# Patient Record
Sex: MALE | Race: WHITE | Employment: UNEMPLOYED | URBAN - NONMETROPOLITAN AREA
[De-identification: names, ages, dates, MRNs, and addresses within clinical notes are randomized per-mention and may not be internally consistent; named-entity substitution may affect disease eponyms.]

---

## 2018-08-04 ENCOUNTER — APPOINTMENT (OUTPATIENT)
Dept: CT IMAGING | Age: 48
DRG: 897 | End: 2018-08-04
Payer: COMMERCIAL

## 2018-08-04 ENCOUNTER — APPOINTMENT (OUTPATIENT)
Dept: GENERAL RADIOLOGY | Age: 48
DRG: 897 | End: 2018-08-04
Payer: COMMERCIAL

## 2018-08-04 ENCOUNTER — HOSPITAL ENCOUNTER (INPATIENT)
Age: 48
LOS: 4 days | Discharge: NURSING, REHAB OR LONG-TERM CARE | DRG: 897 | End: 2018-08-08
Attending: EMERGENCY MEDICINE | Admitting: INTERNAL MEDICINE
Payer: COMMERCIAL

## 2018-08-04 DIAGNOSIS — R09.02 HYPOXIA: ICD-10-CM

## 2018-08-04 DIAGNOSIS — B20 HUMAN IMMUNODEFICIENCY VIRUS (HIV) DISEASE (HCC): ICD-10-CM

## 2018-08-04 DIAGNOSIS — Z72.0 TOBACCO ABUSE: ICD-10-CM

## 2018-08-04 DIAGNOSIS — F10.931 ALCOHOL WITHDRAWAL SYNDROME, WITH DELIRIUM (HCC): Primary | ICD-10-CM

## 2018-08-04 LAB
ALBUMIN SERPL-MCNC: 3.7 G/DL (ref 3.5–5.2)
ALP BLD-CCNC: 117 U/L (ref 40–130)
ALT SERPL-CCNC: 32 U/L (ref 5–41)
AMMONIA: 55 UMOL/L (ref 16–60)
AMPHETAMINE SCREEN, URINE: NEGATIVE
ANION GAP SERPL CALCULATED.3IONS-SCNC: 11 MMOL/L (ref 7–19)
AST SERPL-CCNC: 57 U/L (ref 5–40)
BARBITURATE SCREEN URINE: NEGATIVE
BASE EXCESS ARTERIAL: 0.2 MMOL/L (ref -2–2)
BASOPHILS ABSOLUTE: 0.2 K/UL (ref 0–0.2)
BASOPHILS RELATIVE PERCENT: 2 % (ref 0–1)
BENZODIAZEPINE SCREEN, URINE: NEGATIVE
BILIRUB SERPL-MCNC: 0.7 MG/DL (ref 0.2–1.2)
BUN BLDV-MCNC: 8 MG/DL (ref 6–20)
CALCIUM SERPL-MCNC: 8.9 MG/DL (ref 8.6–10)
CANNABINOID SCREEN URINE: NEGATIVE
CARBOXYHEMOGLOBIN ARTERIAL: 3.1 % (ref 0–5)
CHLORIDE BLD-SCNC: 99 MMOL/L (ref 98–111)
CO2: 24 MMOL/L (ref 22–29)
COCAINE METABOLITE SCREEN URINE: NEGATIVE
CREAT SERPL-MCNC: 0.5 MG/DL (ref 0.5–1.2)
D DIMER: 0.72 UG/ML FEU (ref 0–0.48)
EOSINOPHILS ABSOLUTE: 0.8 K/UL (ref 0–0.6)
EOSINOPHILS RELATIVE PERCENT: 8.6 % (ref 0–5)
ETHANOL: <10 MG/DL (ref 0–0.08)
GFR NON-AFRICAN AMERICAN: >60
GLUCOSE BLD-MCNC: 119 MG/DL (ref 74–109)
HCO3 ARTERIAL: 24.6 MMOL/L (ref 22–26)
HCT VFR BLD CALC: 33.3 % (ref 42–52)
HEMOGLOBIN, ART, EXTENDED: 10.5 G/DL (ref 14–18)
HEMOGLOBIN: 10.6 G/DL (ref 14–18)
LYMPHOCYTES ABSOLUTE: 1.9 K/UL (ref 1.1–4.5)
LYMPHOCYTES RELATIVE PERCENT: 20.6 % (ref 20–40)
Lab: NORMAL
MAGNESIUM: 2 MG/DL (ref 1.6–2.6)
MCH RBC QN AUTO: 36.9 PG (ref 27–31)
MCHC RBC AUTO-ENTMCNC: 31.8 G/DL (ref 33–37)
MCV RBC AUTO: 116 FL (ref 80–94)
METHEMOGLOBIN ARTERIAL: 1.3 %
MONOCYTES ABSOLUTE: 0.8 K/UL (ref 0–0.9)
MONOCYTES RELATIVE PERCENT: 8.3 % (ref 0–10)
NEUTROPHILS ABSOLUTE: 5.4 K/UL (ref 1.5–7.5)
NEUTROPHILS RELATIVE PERCENT: 58.5 % (ref 50–65)
O2 CONTENT ARTERIAL: 13.3 ML/DL
O2 SAT, ARTERIAL: 89.2 %
O2 THERAPY: ABNORMAL
OPIATE SCREEN URINE: NEGATIVE
PCO2 ARTERIAL: 38 MMHG (ref 35–45)
PDW BLD-RTO: 13.4 % (ref 11.5–14.5)
PH ARTERIAL: 7.42 (ref 7.35–7.45)
PHOSPHORUS: 4.2 MG/DL (ref 2.5–4.5)
PLATELET # BLD: 285 K/UL (ref 130–400)
PMV BLD AUTO: 9.4 FL (ref 9.4–12.4)
PO2 ARTERIAL: 84 MMHG (ref 80–100)
POTASSIUM SERPL-SCNC: 3.6 MMOL/L (ref 3.5–5)
POTASSIUM, WHOLE BLOOD: 3.7
RBC # BLD: 2.87 M/UL (ref 4.7–6.1)
SODIUM BLD-SCNC: 134 MMOL/L (ref 136–145)
TOTAL PROTEIN: 7.2 G/DL (ref 6.6–8.7)
WBC # BLD: 9.2 K/UL (ref 4.8–10.8)

## 2018-08-04 PROCEDURE — 2500000003 HC RX 250 WO HCPCS: Performed by: HOSPITALIST

## 2018-08-04 PROCEDURE — 6360000004 HC RX CONTRAST MEDICATION: Performed by: EMERGENCY MEDICINE

## 2018-08-04 PROCEDURE — 2580000003 HC RX 258: Performed by: HOSPITALIST

## 2018-08-04 PROCEDURE — G0480 DRUG TEST DEF 1-7 CLASSES: HCPCS

## 2018-08-04 PROCEDURE — 6370000000 HC RX 637 (ALT 250 FOR IP): Performed by: EMERGENCY MEDICINE

## 2018-08-04 PROCEDURE — 84100 ASSAY OF PHOSPHORUS: CPT

## 2018-08-04 PROCEDURE — 80307 DRUG TEST PRSMV CHEM ANLYZR: CPT

## 2018-08-04 PROCEDURE — 80053 COMPREHEN METABOLIC PANEL: CPT

## 2018-08-04 PROCEDURE — 82803 BLOOD GASES ANY COMBINATION: CPT

## 2018-08-04 PROCEDURE — 70450 CT HEAD/BRAIN W/O DYE: CPT

## 2018-08-04 PROCEDURE — 99285 EMERGENCY DEPT VISIT HI MDM: CPT | Performed by: EMERGENCY MEDICINE

## 2018-08-04 PROCEDURE — 85379 FIBRIN DEGRADATION QUANT: CPT

## 2018-08-04 PROCEDURE — 96374 THER/PROPH/DIAG INJ IV PUSH: CPT

## 2018-08-04 PROCEDURE — 6360000002 HC RX W HCPCS: Performed by: EMERGENCY MEDICINE

## 2018-08-04 PROCEDURE — 82140 ASSAY OF AMMONIA: CPT

## 2018-08-04 PROCEDURE — 71045 X-RAY EXAM CHEST 1 VIEW: CPT

## 2018-08-04 PROCEDURE — 99291 CRITICAL CARE FIRST HOUR: CPT | Performed by: HOSPITALIST

## 2018-08-04 PROCEDURE — 6370000000 HC RX 637 (ALT 250 FOR IP): Performed by: HOSPITALIST

## 2018-08-04 PROCEDURE — 2580000003 HC RX 258: Performed by: NURSE PRACTITIONER

## 2018-08-04 PROCEDURE — 84630 ASSAY OF ZINC: CPT

## 2018-08-04 PROCEDURE — 6360000002 HC RX W HCPCS: Performed by: HOSPITALIST

## 2018-08-04 PROCEDURE — 96376 TX/PRO/DX INJ SAME DRUG ADON: CPT

## 2018-08-04 PROCEDURE — 6360000002 HC RX W HCPCS: Performed by: NURSE PRACTITIONER

## 2018-08-04 PROCEDURE — 83735 ASSAY OF MAGNESIUM: CPT

## 2018-08-04 PROCEDURE — 85025 COMPLETE CBC W/AUTO DIFF WBC: CPT

## 2018-08-04 PROCEDURE — 2500000003 HC RX 250 WO HCPCS: Performed by: NURSE PRACTITIONER

## 2018-08-04 PROCEDURE — 94640 AIRWAY INHALATION TREATMENT: CPT

## 2018-08-04 PROCEDURE — 36415 COLL VENOUS BLD VENIPUNCTURE: CPT

## 2018-08-04 PROCEDURE — 36600 WITHDRAWAL OF ARTERIAL BLOOD: CPT

## 2018-08-04 PROCEDURE — 84132 ASSAY OF SERUM POTASSIUM: CPT

## 2018-08-04 PROCEDURE — 99223 1ST HOSP IP/OBS HIGH 75: CPT | Performed by: HOSPITALIST

## 2018-08-04 PROCEDURE — 2000000000 HC ICU R&B

## 2018-08-04 PROCEDURE — 71275 CT ANGIOGRAPHY CHEST: CPT

## 2018-08-04 PROCEDURE — 99406 BEHAV CHNG SMOKING 3-10 MIN: CPT | Performed by: HOSPITALIST

## 2018-08-04 PROCEDURE — 99285 EMERGENCY DEPT VISIT HI MDM: CPT

## 2018-08-04 RX ORDER — CYCLOBENZAPRINE HCL 10 MG
10 TABLET ORAL 3 TIMES DAILY PRN
Status: DISCONTINUED | OUTPATIENT
Start: 2018-08-04 | End: 2018-08-08 | Stop reason: HOSPADM

## 2018-08-04 RX ORDER — POTASSIUM CHLORIDE 7.45 MG/ML
10 INJECTION INTRAVENOUS PRN
Status: DISCONTINUED | OUTPATIENT
Start: 2018-08-04 | End: 2018-08-08 | Stop reason: HOSPADM

## 2018-08-04 RX ORDER — ESCITALOPRAM OXALATE 10 MG/1
20 TABLET ORAL DAILY
Status: DISCONTINUED | OUTPATIENT
Start: 2018-08-04 | End: 2018-08-08 | Stop reason: HOSPADM

## 2018-08-04 RX ORDER — HYDROXYZINE PAMOATE 25 MG/1
25 CAPSULE ORAL 3 TIMES DAILY PRN
COMMUNITY

## 2018-08-04 RX ORDER — LORAZEPAM 2 MG/ML
4 INJECTION INTRAMUSCULAR
Status: DISCONTINUED | OUTPATIENT
Start: 2018-08-04 | End: 2018-08-08 | Stop reason: HOSPADM

## 2018-08-04 RX ORDER — KETAMINE HYDROCHLORIDE 10 MG/ML
30 INJECTION, SOLUTION INTRAMUSCULAR; INTRAVENOUS ONCE
Status: COMPLETED | OUTPATIENT
Start: 2018-08-04 | End: 2018-08-04

## 2018-08-04 RX ORDER — IPRATROPIUM BROMIDE AND ALBUTEROL SULFATE 2.5; .5 MG/3ML; MG/3ML
1 SOLUTION RESPIRATORY (INHALATION) ONCE
Status: COMPLETED | OUTPATIENT
Start: 2018-08-04 | End: 2018-08-04

## 2018-08-04 RX ORDER — FOLIC ACID 1 MG/1
1 TABLET ORAL DAILY
Status: DISCONTINUED | OUTPATIENT
Start: 2018-08-08 | End: 2018-08-08 | Stop reason: HOSPADM

## 2018-08-04 RX ORDER — NICOTINE 21 MG/24HR
1 PATCH, TRANSDERMAL 24 HOURS TRANSDERMAL DAILY
Status: DISCONTINUED | OUTPATIENT
Start: 2018-08-04 | End: 2018-08-08 | Stop reason: HOSPADM

## 2018-08-04 RX ORDER — HYDROXYZINE PAMOATE 25 MG/1
25 CAPSULE ORAL 3 TIMES DAILY PRN
Status: DISCONTINUED | OUTPATIENT
Start: 2018-08-04 | End: 2018-08-08 | Stop reason: HOSPADM

## 2018-08-04 RX ORDER — CLONIDINE HYDROCHLORIDE 0.1 MG/1
0.1 TABLET ORAL 2 TIMES DAILY
COMMUNITY

## 2018-08-04 RX ORDER — LORAZEPAM 2 MG/ML
3 INJECTION INTRAMUSCULAR
Status: DISCONTINUED | OUTPATIENT
Start: 2018-08-04 | End: 2018-08-08 | Stop reason: HOSPADM

## 2018-08-04 RX ORDER — TRAZODONE HYDROCHLORIDE 50 MG/1
50 TABLET ORAL NIGHTLY
Status: DISCONTINUED | OUTPATIENT
Start: 2018-08-04 | End: 2018-08-08 | Stop reason: HOSPADM

## 2018-08-04 RX ORDER — THIAMINE HYDROCHLORIDE 100 MG/ML
100 INJECTION, SOLUTION INTRAMUSCULAR; INTRAVENOUS DAILY
Status: DISCONTINUED | OUTPATIENT
Start: 2018-08-08 | End: 2018-08-08 | Stop reason: HOSPADM

## 2018-08-04 RX ORDER — ONDANSETRON 2 MG/ML
4 INJECTION INTRAMUSCULAR; INTRAVENOUS EVERY 6 HOURS PRN
Status: DISCONTINUED | OUTPATIENT
Start: 2018-08-04 | End: 2018-08-08 | Stop reason: HOSPADM

## 2018-08-04 RX ORDER — LORAZEPAM 1 MG/1
3 TABLET ORAL
Status: DISCONTINUED | OUTPATIENT
Start: 2018-08-04 | End: 2018-08-08 | Stop reason: HOSPADM

## 2018-08-04 RX ORDER — SODIUM CHLORIDE 0.9 % (FLUSH) 0.9 %
10 SYRINGE (ML) INJECTION PRN
Status: DISCONTINUED | OUTPATIENT
Start: 2018-08-04 | End: 2018-08-08 | Stop reason: HOSPADM

## 2018-08-04 RX ORDER — ONDANSETRON 2 MG/ML
4 INJECTION INTRAMUSCULAR; INTRAVENOUS EVERY 6 HOURS PRN
Status: DISCONTINUED | OUTPATIENT
Start: 2018-08-04 | End: 2018-08-04

## 2018-08-04 RX ORDER — TRAZODONE HYDROCHLORIDE 50 MG/1
50 TABLET ORAL NIGHTLY
COMMUNITY

## 2018-08-04 RX ORDER — SODIUM CHLORIDE 0.9 % (FLUSH) 0.9 %
10 SYRINGE (ML) INJECTION EVERY 12 HOURS SCHEDULED
Status: DISCONTINUED | OUTPATIENT
Start: 2018-08-04 | End: 2018-08-08 | Stop reason: HOSPADM

## 2018-08-04 RX ORDER — LORAZEPAM 2 MG/1
2 TABLET ORAL EVERY 6 HOURS PRN
COMMUNITY

## 2018-08-04 RX ORDER — HYDROCODONE BITARTRATE AND ACETAMINOPHEN 7.5; 325 MG/1; MG/1
1 TABLET ORAL ONCE
Status: DISCONTINUED | OUTPATIENT
Start: 2018-08-04 | End: 2018-08-04

## 2018-08-04 RX ORDER — LORAZEPAM 2 MG/ML
2 INJECTION INTRAMUSCULAR 2 TIMES DAILY
Status: COMPLETED | OUTPATIENT
Start: 2018-08-06 | End: 2018-08-06

## 2018-08-04 RX ORDER — LORAZEPAM 2 MG/ML
2 INJECTION INTRAMUSCULAR EVERY 8 HOURS SCHEDULED
Status: DISPENSED | OUTPATIENT
Start: 2018-08-05 | End: 2018-08-06

## 2018-08-04 RX ORDER — LORAZEPAM 2 MG/ML
2 INJECTION INTRAMUSCULAR ONCE
Status: COMPLETED | OUTPATIENT
Start: 2018-08-04 | End: 2018-08-04

## 2018-08-04 RX ORDER — LORAZEPAM 1 MG/1
1 TABLET ORAL
Status: DISCONTINUED | OUTPATIENT
Start: 2018-08-04 | End: 2018-08-08 | Stop reason: HOSPADM

## 2018-08-04 RX ORDER — LORAZEPAM 2 MG/ML
2 INJECTION INTRAMUSCULAR ONCE
Status: COMPLETED | OUTPATIENT
Start: 2018-08-07 | End: 2018-08-06

## 2018-08-04 RX ORDER — MULTIVITAMIN WITH FOLIC ACID 400 MCG
1 TABLET ORAL DAILY
Status: DISCONTINUED | OUTPATIENT
Start: 2018-08-08 | End: 2018-08-08 | Stop reason: HOSPADM

## 2018-08-04 RX ORDER — LORAZEPAM 2 MG/ML
1 INJECTION INTRAMUSCULAR ONCE
Status: COMPLETED | OUTPATIENT
Start: 2018-08-04 | End: 2018-08-04

## 2018-08-04 RX ORDER — DICYCLOMINE HYDROCHLORIDE 10 MG/1
10 CAPSULE ORAL
Status: DISCONTINUED | OUTPATIENT
Start: 2018-08-04 | End: 2018-08-08 | Stop reason: HOSPADM

## 2018-08-04 RX ORDER — DICYCLOMINE HYDROCHLORIDE 10 MG/1
10 CAPSULE ORAL
COMMUNITY

## 2018-08-04 RX ORDER — MAGNESIUM SULFATE 1 G/100ML
1 INJECTION INTRAVENOUS PRN
Status: DISCONTINUED | OUTPATIENT
Start: 2018-08-04 | End: 2018-08-08 | Stop reason: HOSPADM

## 2018-08-04 RX ORDER — POLYETHYLENE GLYCOL 3350 17 G/17G
17 POWDER, FOR SOLUTION ORAL DAILY PRN
Status: DISCONTINUED | OUTPATIENT
Start: 2018-08-04 | End: 2018-08-08 | Stop reason: HOSPADM

## 2018-08-04 RX ORDER — ACETAMINOPHEN 325 MG/1
650 TABLET ORAL EVERY 6 HOURS PRN
COMMUNITY

## 2018-08-04 RX ORDER — CYCLOBENZAPRINE HCL 10 MG
10 TABLET ORAL 3 TIMES DAILY PRN
COMMUNITY

## 2018-08-04 RX ORDER — IBUPROFEN 200 MG
200 TABLET ORAL EVERY 6 HOURS PRN
COMMUNITY
End: 2018-08-04

## 2018-08-04 RX ORDER — LORAZEPAM 1 MG/1
4 TABLET ORAL
Status: DISCONTINUED | OUTPATIENT
Start: 2018-08-04 | End: 2018-08-08 | Stop reason: HOSPADM

## 2018-08-04 RX ORDER — LORAZEPAM 2 MG/ML
1 INJECTION INTRAMUSCULAR
Status: DISCONTINUED | OUTPATIENT
Start: 2018-08-04 | End: 2018-08-08 | Stop reason: HOSPADM

## 2018-08-04 RX ORDER — ESCITALOPRAM OXALATE 20 MG/1
20 TABLET ORAL DAILY
COMMUNITY

## 2018-08-04 RX ORDER — CLONIDINE HYDROCHLORIDE 0.1 MG/1
0.1 TABLET ORAL 2 TIMES DAILY
Status: DISCONTINUED | OUTPATIENT
Start: 2018-08-04 | End: 2018-08-08 | Stop reason: HOSPADM

## 2018-08-04 RX ORDER — LORAZEPAM 2 MG/ML
4 INJECTION INTRAMUSCULAR ONCE
Status: COMPLETED | OUTPATIENT
Start: 2018-08-04 | End: 2018-08-04

## 2018-08-04 RX ORDER — LORAZEPAM 2 MG/ML
2 INJECTION INTRAMUSCULAR
Status: DISCONTINUED | OUTPATIENT
Start: 2018-08-04 | End: 2018-08-08 | Stop reason: HOSPADM

## 2018-08-04 RX ORDER — THIAMINE MONONITRATE (VIT B1) 100 MG
100 TABLET ORAL DAILY
Status: DISCONTINUED | OUTPATIENT
Start: 2018-08-08 | End: 2018-08-08 | Stop reason: HOSPADM

## 2018-08-04 RX ORDER — LORAZEPAM 1 MG/1
2 TABLET ORAL
Status: DISCONTINUED | OUTPATIENT
Start: 2018-08-04 | End: 2018-08-08 | Stop reason: HOSPADM

## 2018-08-04 RX ADMIN — LORAZEPAM 2 MG: 2 INJECTION INTRAMUSCULAR; INTRAVENOUS at 19:42

## 2018-08-04 RX ADMIN — IPRATROPIUM BROMIDE AND ALBUTEROL SULFATE 1 AMPULE: .5; 3 SOLUTION RESPIRATORY (INHALATION) at 18:30

## 2018-08-04 RX ADMIN — Medication 30 MG: at 22:10

## 2018-08-04 RX ADMIN — LORAZEPAM 2 MG: 2 INJECTION INTRAMUSCULAR; INTRAVENOUS at 19:35

## 2018-08-04 RX ADMIN — Medication 10 ML: at 22:10

## 2018-08-04 RX ADMIN — LORAZEPAM 4 MG: 2 INJECTION INTRAMUSCULAR; INTRAVENOUS at 20:45

## 2018-08-04 RX ADMIN — ENOXAPARIN SODIUM 40 MG: 40 INJECTION SUBCUTANEOUS at 22:14

## 2018-08-04 RX ADMIN — DEXMEDETOMIDINE HYDROCHLORIDE 0.2 MCG/KG/HR: 100 INJECTION, SOLUTION INTRAVENOUS at 19:46

## 2018-08-04 RX ADMIN — LORAZEPAM 4 MG: 2 INJECTION INTRAMUSCULAR; INTRAVENOUS at 20:52

## 2018-08-04 RX ADMIN — LORAZEPAM 2 MG: 2 INJECTION INTRAMUSCULAR; INTRAVENOUS at 17:52

## 2018-08-04 RX ADMIN — LORAZEPAM 2 MG: 2 INJECTION INTRAMUSCULAR; INTRAVENOUS at 18:46

## 2018-08-04 RX ADMIN — FOLIC ACID: 5 INJECTION, SOLUTION INTRAMUSCULAR; INTRAVENOUS; SUBCUTANEOUS at 18:45

## 2018-08-04 RX ADMIN — IOPAMIDOL 90 ML: 755 INJECTION, SOLUTION INTRAVENOUS at 17:59

## 2018-08-04 ASSESSMENT — ENCOUNTER SYMPTOMS
NAUSEA: 0
TROUBLE SWALLOWING: 0
RHINORRHEA: 0
CONSTIPATION: 0
SORE THROAT: 0
VOMITING: 0
ABDOMINAL PAIN: 0
SHORTNESS OF BREATH: 0
DIARRHEA: 0
COUGH: 0

## 2018-08-04 ASSESSMENT — PAIN SCALES - GENERAL: PAINLEVEL_OUTOF10: 0

## 2018-08-04 NOTE — ED PROVIDER NOTES
MEDICAL HISTORY     Past Medical History:   Diagnosis Date    HIV (human immunodeficiency virus infection) ([facility] [address]          SURGICAL HISTORY     History reviewed. No pertinent surgical history. CURRENT MEDICATIONS       Previous Medications    ACETAMINOPHEN (TYLENOL) 325 MG TABLET    Take 650 mg by mouth every 6 hours as needed for Pain    CLONIDINE (CATAPRES) 0.1 MG TABLET    Take 0.1 mg by mouth 2 times daily    CYCLOBENZAPRINE (FLEXERIL) 10 MG TABLET    Take 10 mg by mouth 3 times daily as needed for Muscle spasms    DICYCLOMINE (BENTYL) 10 MG CAPSULE    Take 10 mg by mouth 4 times daily (before meals and nightly)    PIHTZQP-YDIWP-NXJXAHTJ-TENOFAF (GENVOYA PO)    Take by mouth    ESCITALOPRAM (LEXAPRO) 20 MG TABLET    Take 20 mg by mouth daily    HYDROXYZINE (VISTARIL) 25 MG CAPSULE    Take 25 mg by mouth 3 times daily as needed for Itching    IBUPROFEN (ADVIL;MOTRIN) 200 MG TABLET    Take 200 mg by mouth every 6 hours as needed for Pain    LORAZEPAM (ATIVAN) 2 MG TABLET    Take 2 mg by mouth every 6 hours as needed for Anxiety. .    TRAZODONE (DESYREL) 50 MG TABLET    Take 50 mg by mouth nightly       ALLERGIES     Patient has no known allergies. FAMILY HISTORY     History reviewed. No pertinent family history. SOCIAL HISTORY       Social History     Social History    Marital status: Single     Spouse name: N/A    Number of children: N/A    Years of education: N/A     Social History Main Topics    Smoking status: Current Every Day Smoker     Packs/day: 2.00    Smokeless tobacco: Never Used    Alcohol use Yes      Comment: Everyday    Drug use: No    Sexual activity: Not Asked     Other Topics Concern    None     Social History Narrative    None       SCREENINGS    [place] Coma Scale  Eye Opening:  To speech  Best Verbal Response: Confused  Best Motor Response: Obeys commands  [place] Coma Scale Score: 13        PHYSICAL EXAM    (up to 7 for level 4, 8 or more for level 5)     ED Triage Vitals [08/04/18 1350]   BP Temp Temp Source Pulse Resp SpO2 Height Weight   117/86 98.5 °F (36.9 °C) Oral 106 16 91 % 5' 8\" (1.727 m) 130 lb (59 kg)       Physical Exam   Constitutional: He appears well-developed and well-nourished. HENT:   Head: Normocephalic and atraumatic. Eyes: Conjunctivae are normal. Pupils are equal, round, and reactive to light. No scleral icterus. Neck: Normal range of motion. Neck supple. Cardiovascular: Normal rate, regular rhythm, normal heart sounds and intact distal pulses. Pulmonary/Chest: Effort normal and breath sounds normal.   Abdominal: Soft. Bowel sounds are normal. He exhibits no distension. There is no tenderness. There is no rebound. Neurological: He is alert. Skin: Skin is warm and dry. Psychiatric: His speech is normal. Judgment and thought content normal.   delirious       DIAGNOSTIC RESULTS     EKG: All EKG's are interpreted by the Emergency Department Physician who either signs or Co-signs this chart in the absence of a cardiologist.        RADIOLOGY:   Non-plain film images such as CT, Ultrasound and MRI are read by the radiologist. Plain radiographic images are visualized and preliminarily interpreted by the emergency physician with the below findings:        Interpretation per the Radiologist below, if available at the time of this note:    CTA PULMONARY W CONTRAST   Final Result   1. No evidence of pulmonary embolus. 2.  Bilateral emphysematous changes are greater than expected given   the patient's age. Consider alpha-1 antitrypsin deficiency if the   patient is not a heavy smoker. 3.  Fatty liver disease and chronic pancreatitis. Signed by Dr Altaf Moore on 8/4/2018 6:39 PM      XR CHEST PORTABLE   Final Result   1. No radiographic evidence of acute cardiopulmonary process. Signed by Dr Altaf Moore on 8/4/2018 5:54 PM      CT Head WO Contrast   Final Result   1. No hemorrhage, edema or mass effect. No acute findings.    2. Mild atrophy. The full report of this exam was immediately signed and available to   the emergency room. The patient is currently in the emergency room. Signed by Dr Heidi Villanueva on 8/4/2018 3:32 PM            ED BEDSIDE ULTRASOUND:   Performed by ED Physician - none    LABS:  Labs Reviewed   CBC WITH AUTO DIFFERENTIAL - Abnormal; Notable for the following:        Result Value    RBC 2.87 (*)     Hemoglobin 10.6 (*)     Hematocrit 33.3 (*)     .0 (*)     MCH 36.9 (*)     MCHC 31.8 (*)     Eosinophils % 8.6 (*)     Basophils % 2.0 (*)     Eosinophils # 0.80 (*)     All other components within normal limits   COMPREHENSIVE METABOLIC PANEL - Abnormal; Notable for the following:     Sodium 134 (*)     Glucose 119 (*)     AST 57 (*)     All other components within normal limits   BLOOD GAS, ARTERIAL - Abnormal; Notable for the following:     Hemoglobin, Art, Extended 10.5 (*)     O2 Sat, Arterial 89.2 (*)     All other components within normal limits   D-DIMER, QUANTITATIVE - Abnormal; Notable for the following:     D-Dimer, Quant 0.72 (*)     All other components within normal limits   AMMONIA   ETHANOL   URINE DRUG SCREEN   POTASSIUM, WHOLE BLOOD   MAGNESIUM   PHOSPHORUS       All other labs were within normal range or not returned as of this dictation. EMERGENCY DEPARTMENT COURSE and DIFFERENTIAL DIAGNOSIS/MDM:   Vitals:    Vitals:    08/04/18 1702 08/04/18 1732 08/04/18 1756 08/04/18 1840   BP: 111/75 121/85  119/79   Pulse: 90 88 81 93   Resp: 16 14  16   Temp:       TempSrc:       SpO2: (!) 89% (!) 89% 90% 91%   Weight:       Height:           MDM  Discussed case with Dr Marcela Riggs. Discussed case with Dr Butch Marie, pt is going to be admitted to ICU. CONSULTS:  None    PROCEDURES:  Unless otherwise noted below, none     Procedures    FINAL IMPRESSION      1. Alcohol withdrawal syndrome, with delirium ([facility] [address]    2. Hypoxia    3. Human immunodeficiency virus (HIV) disease ([facility] [address]    4.  Tobacco abuse

## 2018-08-04 NOTE — ED NOTES
Lab notified of add on magnesium and phosphorus, spoke with [phone], [facility]  08/04/18 1746

## 2018-08-05 PROBLEM — D64.9 ANEMIA: Status: ACTIVE | Noted: 2018-08-05

## 2018-08-05 PROBLEM — E87.6 HYPOKALEMIA: Status: ACTIVE | Noted: 2018-08-05

## 2018-08-05 LAB
ALBUMIN SERPL-MCNC: 3.2 G/DL (ref 3.5–5.2)
ALP BLD-CCNC: 103 U/L (ref 40–130)
ALT SERPL-CCNC: 28 U/L (ref 5–41)
ANION GAP SERPL CALCULATED.3IONS-SCNC: 11 MMOL/L (ref 7–19)
AST SERPL-CCNC: 45 U/L (ref 5–40)
BASOPHILS ABSOLUTE: 0.1 K/UL (ref 0–0.2)
BASOPHILS RELATIVE PERCENT: 1.4 % (ref 0–1)
BILIRUB SERPL-MCNC: 0.6 MG/DL (ref 0.2–1.2)
BUN BLDV-MCNC: 5 MG/DL (ref 6–20)
CALCIUM SERPL-MCNC: 7.7 MG/DL (ref 8.6–10)
CHLORIDE BLD-SCNC: 103 MMOL/L (ref 98–111)
CO2: 24 MMOL/L (ref 22–29)
CREAT SERPL-MCNC: <0.5 MG/DL (ref 0.5–1.2)
EOSINOPHILS ABSOLUTE: 0.7 K/UL (ref 0–0.6)
EOSINOPHILS RELATIVE PERCENT: 7.7 % (ref 0–5)
GFR NON-AFRICAN AMERICAN: >60
GLUCOSE BLD-MCNC: 99 MG/DL (ref 74–109)
HCT VFR BLD CALC: 31.6 % (ref 42–52)
HEMOGLOBIN: 9.9 G/DL (ref 14–18)
LYMPHOCYTES ABSOLUTE: 2 K/UL (ref 1.1–4.5)
LYMPHOCYTES RELATIVE PERCENT: 23.4 % (ref 20–40)
MAGNESIUM: 2 MG/DL (ref 1.6–2.6)
MCH RBC QN AUTO: 36.3 PG (ref 27–31)
MCHC RBC AUTO-ENTMCNC: 31.3 G/DL (ref 33–37)
MCV RBC AUTO: 115.8 FL (ref 80–94)
MONOCYTES ABSOLUTE: 0.6 K/UL (ref 0–0.9)
MONOCYTES RELATIVE PERCENT: 6.9 % (ref 0–10)
NEUTROPHILS ABSOLUTE: 4.9 K/UL (ref 1.5–7.5)
NEUTROPHILS RELATIVE PERCENT: 58.2 % (ref 50–65)
PDW BLD-RTO: 13.3 % (ref 11.5–14.5)
PLATELET # BLD: 253 K/UL (ref 130–400)
PMV BLD AUTO: 9.3 FL (ref 9.4–12.4)
POTASSIUM REFLEX MAGNESIUM: 3.4 MMOL/L (ref 3.5–5)
RBC # BLD: 2.73 M/UL (ref 4.7–6.1)
SODIUM BLD-SCNC: 138 MMOL/L (ref 136–145)
TOTAL PROTEIN: 5.9 G/DL (ref 6.6–8.7)
WBC # BLD: 8.4 K/UL (ref 4.8–10.8)

## 2018-08-05 PROCEDURE — 6360000002 HC RX W HCPCS: Performed by: HOSPITALIST

## 2018-08-05 PROCEDURE — 2580000003 HC RX 258: Performed by: HOSPITALIST

## 2018-08-05 PROCEDURE — 85025 COMPLETE CBC W/AUTO DIFF WBC: CPT

## 2018-08-05 PROCEDURE — 36415 COLL VENOUS BLD VENIPUNCTURE: CPT

## 2018-08-05 PROCEDURE — 83735 ASSAY OF MAGNESIUM: CPT

## 2018-08-05 PROCEDURE — 2000000000 HC ICU R&B

## 2018-08-05 PROCEDURE — 80053 COMPREHEN METABOLIC PANEL: CPT

## 2018-08-05 PROCEDURE — 2500000003 HC RX 250 WO HCPCS: Performed by: HOSPITALIST

## 2018-08-05 PROCEDURE — 6370000000 HC RX 637 (ALT 250 FOR IP): Performed by: HOSPITALIST

## 2018-08-05 PROCEDURE — 2700000000 HC OXYGEN THERAPY PER DAY

## 2018-08-05 PROCEDURE — 99233 SBSQ HOSP IP/OBS HIGH 50: CPT | Performed by: INTERNAL MEDICINE

## 2018-08-05 RX ADMIN — CLONIDINE HYDROCHLORIDE 0.1 MG: 0.1 TABLET ORAL at 21:48

## 2018-08-05 RX ADMIN — DICYCLOMINE HYDROCHLORIDE 10 MG: 10 CAPSULE ORAL at 11:49

## 2018-08-05 RX ADMIN — LORAZEPAM 2 MG: 2 INJECTION INTRAMUSCULAR; INTRAVENOUS at 21:48

## 2018-08-05 RX ADMIN — DICYCLOMINE HYDROCHLORIDE 10 MG: 10 CAPSULE ORAL at 21:48

## 2018-08-05 RX ADMIN — ESCITALOPRAM OXALATE 20 MG: 10 TABLET ORAL at 11:50

## 2018-08-05 RX ADMIN — Medication 10 ML: at 21:48

## 2018-08-05 RX ADMIN — FOLIC ACID: 5 INJECTION, SOLUTION INTRAMUSCULAR; INTRAVENOUS; SUBCUTANEOUS at 08:17

## 2018-08-05 RX ADMIN — ENOXAPARIN SODIUM 40 MG: 40 INJECTION SUBCUTANEOUS at 08:17

## 2018-08-05 RX ADMIN — DEXMEDETOMIDINE HYDROCHLORIDE 0.9 MCG/KG/HR: 100 INJECTION, SOLUTION INTRAVENOUS at 02:56

## 2018-08-05 RX ADMIN — TRAZODONE HYDROCHLORIDE 50 MG: 50 TABLET ORAL at 21:48

## 2018-08-05 NOTE — ED NOTES
Sol Granados, clinical house, aware of need for 1:1 sitter in ICU     [facility] [place]  08/04/18 3329

## 2018-08-05 NOTE — PROGRESS NOTES
Lacie Raya arrived to room # 148. Presented with: alcohol withdrawal  Mental Status: Patient is uncooperative with treatment and combative, Code Aruna called upon admission to ICU. Patient tried to hit, kick, pinch, spit, and bite staff. 4-pt restraints and mitt restraints placed and multiple medications given per MD orders (see MAR). Vitals:    08/04/18 1932   BP: 107/75   Pulse: 100   Resp: 14   Temp: 98.2 °F (36.8 °C)   SpO2: 90%     Patient safety contract and falls prevention contract reviewed with patient No, patient uncooperative at this time. Oriented patient oriented to room but remains confused at this time. Family came back from waiting room but went home for the evening. Call light within reach. No.  Needs, issues or concerns expressed at this time: yes, agitation addressed. Patient remained uncooperative with treatment.       Electronically signed by Roxana Wan RN on 8/4/2018 at 9:45 PM

## 2018-08-05 NOTE — ED NOTES
Called for a sitter     Belkys Perales, [facility]  08/04/18 8499

## 2018-08-05 NOTE — ED NOTES
Report called, Dr. Maciej Middleton still at the bedside, will transfer to ICU when he is finished      [facility]  08/04/18 2003

## 2018-08-05 NOTE — PROGRESS NOTES
4 Eyes Skin Assessment    Severiano Rocher is being assessed upon: Admission    I agree that I, Marylee Elam, along with Ramiro Blanco RN (either 2 RN's or 1 LPN and 1 RN) have performed a thorough Head to Toe Skin Assessment on the patient. ALL assessment sites listed below have been assessed. Areas assessed by both nurses:     [x]   Head, Face, and Ears   [x]   Shoulders, Back, and Chest  [x]   Arms, Elbows, and Hands   [x]   Coccyx, Sacrum, and Ischium  [x]   Legs, Feet, and Heels    Does the Patient have Skin Breakdown?  No, multiple abrasions present to BLE    Darío Prevention initiated: No  Wound Care Orders initiated: No    WOC nurse consulted for Pressure Injury (Stage 3,4, Unstageable, DTI, NWPT, and Complex wounds) and New or Established Ostomies: No        Primary Nurse eSignature: Marylee Elam, RN on 8/4/2018 at 11:13 PM      Co-Signer eSignature: Electronically signed by Ramiro Blanco RN on 8/5/18 at 2:20 AM

## 2018-08-05 NOTE — PROGRESS NOTES
40 mg at 08/05/18 0817    sodium chloride 0.9 % 4,918 mL with folic acid 1 mg, adult multi-vitamin with vitamin k 10 mL, thiamine 100 mg   Intravenous Daily Paola Hebert  mL/hr at 08/05/18 0817      [START ON 8/8/2018] vitamin B-1 (THIAMINE) tablet 100 mg  100 mg Oral Daily Paola Hebert MD        [START ON 8/8/2018] thiamine (B-1) injection 100 mg  100 mg Intramuscular Daily Paola Hebert MD        [START ON 5/0/5866] folic acid (FOLVITE) tablet 1 mg  1 mg Oral Daily Paola Hebert MD        [START ON 8/8/2018] multivitamin 1 tablet  1 tablet Oral Daily Paola Hebert MD        potassium chloride 10 mEq/100 mL IVPB (Peripheral Line)  10 mEq Intravenous PRN Paola Hebert MD        magnesium sulfate 1 g in dextrose 5% 100 mL IVPB  1 g Intravenous PRN Paola Hebert MD        nicotine (NICODERM CQ) 21 MG/24HR 1 patch  1 patch Transdermal Daily Paola Hebert MD   1 patch at 08/05/18 0817    LORazepam (ATIVAN) tablet 1 mg  1 mg Oral Q1H PRN Paola Hebert MD        Or    LORazepam (ATIVAN) injection 1 mg  1 mg Intravenous Q1H PRN Paola Hebert MD        Or    LORazepam (ATIVAN) tablet 2 mg  2 mg Oral Q1H PRN Paola Hebert MD        Or    LORazepam (ATIVAN) injection 2 mg  2 mg Intravenous Q1H PRN Paola Hebert MD        Or    LORazepam (ATIVAN) tablet 3 mg  3 mg Oral Q1H PRN Paola Hebert MD        Or    LORazepam (ATIVAN) injection 3 mg  3 mg Intravenous Q1H PRN Paola Hebert MD        Or    LORazepam (ATIVAN) tablet 4 mg  4 mg Oral Q1H PRN Paola Hebert MD        Or    LORazepam (ATIVAN) injection 4 mg  4 mg Intravenous Q1H PRN Paola Hebert MD   4 mg at 08/04/18 2052    cloNIDine (CATAPRES) tablet 0.1 mg  0.1 mg Oral BID Paola Hebert MD        dicyclomine (BENTYL) capsule 10 mg  10 mg Oral 4x Daily AC & HS Paola Hebert MD        traZODone (DESYREL) tablet 50 mg  50 mg Oral Nightly Paola Hebert MD        cyclobenzaprine (FLEXERIL) tablet 10 mg  10 mg Oral TID PRN Giancarlo Valdez Code    Discharge planning: TBD      Lisa Sanchez MD  [facility] Hospitalist

## 2018-08-05 NOTE — H&P
ADMITTED: [id number]    PCP:  Juancho Abrams MD    CODE STATUS: Full Code  Health Care Proxy: his Mother, Mrs. Nicole Allen, [phone]        SUBJECTIVE:    Chief Complaint   Patient presents with    Alcohol Intoxication       HPI and ROS:  Mr. Martín Gao is a pleasant 57year old HIV + gentleman from home who presents from a residential drug treatment facility. He was transferred to the [facility] emergency department today for delirium tremens. He was started Wednesday but has been having ongoing tremors for apparently an extended period of time. He had recently started alcohol treatment following losing his employment on Monday. He has had shaking before every time when he has missed his after work drink of alcohol. He has been drinking heavily since age 26years old, he had drank since about age 14years old. He drinks a fifth plus per day of whiskey when he get's home. He also smokes a. .5 packs per day of cigarettes. He neevr used any recreational drug other than marijuana. He also has had: diaphoresis, bed drenching night sweats, weakness, fatigue, overwhelmed, nausea, sneezing at night, and cough productive of sputum. He has chronic and unchanged: lower back pain. He also has not had: fevers, chills, malaise, head ache, neck pain, chest pain, abdominal pain, pleuritic pain, hemoptysis, diarrhea, and dysuria. Past Medical History:   Diagnosis Date    HIV (human immunodeficiency virus infection) ([facility] [address]    Continuous Alcohol Abuse with Physiologic Dependance  Chronic Lower Back Pain  Hx Zinc Deficiency  HTN    Past Psychiatric History:  Depression with Anxiety  Insomnia    History reviewed. No pertinent surgical history.      Social History     Tobacco History     Smoking Status  Current Every Day Smoker Smoking Frequency  1.5 packs/day    Smokeless Tobacco Use  Never Used          Alcohol History     Alcohol Use Status  Yes Comment  Everyday  at least a fifth of liquor daily          Drug Latest Ref Range: 1.5 - 7.5 K/uL 5.4     Lymphocytes # Latest Ref Range: 1.1 - 4.5 K/uL 1.9     Monocytes # Latest Ref Range: 0.00 - 0.90 K/uL 0.80     Eosinophils # Latest Ref Range: 0.00 - 0.60 K/uL 0.80 (H)     Basophils # Latest Ref Range: 0.00 - 0.20 K/uL 0.20     D-Dimer, Quant Latest Ref Range: 0.00 - 0.48 ug/mL FEU 0.72 (H)     O2 Therapy Unknown   Unknown   Hemoglobin, Art, Extended Latest Ref Range: 14.0 - 18.0 g/dL   10.5 (L)   pH, Arterial Latest Ref Range: 7.350 - 7.450    7.420   pCO2, Arterial Latest Ref Range: 35.0 - 45.0 mmHg   38.0   pO2, Arterial Latest Ref Range: 80.0 - 100.0 mmHg   84.0   HCO3, Arterial Latest Ref Range: 22.0 - 26.0 mmol/L   24.6   Base Excess, Arterial Latest Ref Range: -2.0 - 2.0 mmol/L   0.2   O2 Sat, Arterial Latest Ref Range: >92 %   89.2 (L)   O2 Content, Arterial Latest Ref Range: Not Established mL/dL   13.3   Methemoglobin, Arterial Latest Ref Range: <1.5 %   1.3   Carboxyhgb, Arterial Latest Ref Range: 0.0 - 5.0 %   3.1         ASESSMENTS & PLANS:    ADMIT: Medical Intensive Care Unit under Hospitalist Service  ADMITTING DIAGNOSIS: Continuous Alcohol Abuse with Physiologic Dependence in Acute Withdrawal  Patient Active Problem List   Diagnosis    Alcohol withdrawal delirium (H[facility])   CONDITION: Poor  CODE STATUS: Full Code  Health Care Proxy: his Mother, Mrs. Camilo Will, [phone]  VITAL SIGNS: as per unit protocol  ACTIVITY: with assistance, may shower with assistance/supervision   NURSING: General Fall  Seizure and Withdrawal Precautions  DIET: regular Diet  IVF: Not indicated  SPECIAL ORDERS: sitter  MEDICATIONS:  Ativan taper  Ativan CIWA Protocol  Precedex GGT to help control withdrawal  Zofran PRN  Banna Bag in ED, will give for next three days, then PO MV with Thiamine with Folic Acid  ALLERGY ALERTS: Not Applicable  LABS: Zn and Mag level added on to ED labs  LABS: [facility] and CMP in am    Tobacco abuse:  nicoderm patch  niotine product PO PRN  tob cessation by RN for reinforcement  TOb Cessation counseling by me for <10 minutes    Mild Anemia, Transamninitis, Mild Hyponatremia: incidental findings  Will monitor  Hyponatremia will sharif resolve within a few of his 4 banna bags    HIV:   Last viral load and CD4 count unknown  Continue Genvoya 1 tablet PO QDay    Depression with Anxiety, Insomnia:  Continue Trazodone  Continue Escitalopram    HTN:   Continue Catapres    Suspect IBD -per med rec:  Continue Bentyl    DVT PPx: Enoxaparin  GI (PUD) PPx: Not indicated  PT: indicated to prevent deconditioning as per critical illness        71 minutes CC time provided

## 2018-08-06 LAB
ALBUMIN SERPL-MCNC: 3.2 G/DL (ref 3.5–5.2)
ALP BLD-CCNC: 98 U/L (ref 40–130)
ALT SERPL-CCNC: 30 U/L (ref 5–41)
ANION GAP SERPL CALCULATED.3IONS-SCNC: 13 MMOL/L (ref 7–19)
AST SERPL-CCNC: 44 U/L (ref 5–40)
BILIRUB SERPL-MCNC: 0.6 MG/DL (ref 0.2–1.2)
BUN BLDV-MCNC: 7 MG/DL (ref 6–20)
CALCIUM SERPL-MCNC: 7.8 MG/DL (ref 8.6–10)
CHLORIDE BLD-SCNC: 101 MMOL/L (ref 98–111)
CO2: 22 MMOL/L (ref 22–29)
CREAT SERPL-MCNC: 0.5 MG/DL (ref 0.5–1.2)
GFR NON-AFRICAN AMERICAN: >60
GLUCOSE BLD-MCNC: 162 MG/DL (ref 74–109)
HCT VFR BLD CALC: 33.7 % (ref 42–52)
HEMOGLOBIN: 10.9 G/DL (ref 14–18)
MCH RBC QN AUTO: 36.9 PG (ref 27–31)
MCHC RBC AUTO-ENTMCNC: 32.3 G/DL (ref 33–37)
MCV RBC AUTO: 114.2 FL (ref 80–94)
PDW BLD-RTO: 12.7 % (ref 11.5–14.5)
PLATELET # BLD: 253 K/UL (ref 130–400)
PMV BLD AUTO: 9.5 FL (ref 9.4–12.4)
POTASSIUM SERPL-SCNC: 4.4 MMOL/L (ref 3.5–5)
RBC # BLD: 2.95 M/UL (ref 4.7–6.1)
SODIUM BLD-SCNC: 136 MMOL/L (ref 136–145)
TOTAL PROTEIN: 5.8 G/DL (ref 6.6–8.7)
WBC # BLD: 7.1 K/UL (ref 4.8–10.8)

## 2018-08-06 PROCEDURE — 6370000000 HC RX 637 (ALT 250 FOR IP): Performed by: HOSPITALIST

## 2018-08-06 PROCEDURE — 2580000003 HC RX 258: Performed by: HOSPITALIST

## 2018-08-06 PROCEDURE — 85027 COMPLETE CBC AUTOMATED: CPT

## 2018-08-06 PROCEDURE — 2700000000 HC OXYGEN THERAPY PER DAY

## 2018-08-06 PROCEDURE — 1210000000 HC MED SURG R&B

## 2018-08-06 PROCEDURE — 99233 SBSQ HOSP IP/OBS HIGH 50: CPT | Performed by: INTERNAL MEDICINE

## 2018-08-06 PROCEDURE — 80053 COMPREHEN METABOLIC PANEL: CPT

## 2018-08-06 PROCEDURE — 6360000002 HC RX W HCPCS: Performed by: HOSPITALIST

## 2018-08-06 PROCEDURE — 2500000003 HC RX 250 WO HCPCS: Performed by: HOSPITALIST

## 2018-08-06 PROCEDURE — 36415 COLL VENOUS BLD VENIPUNCTURE: CPT

## 2018-08-06 RX ADMIN — DEXMEDETOMIDINE HYDROCHLORIDE 0.6 MCG/KG/HR: 100 INJECTION, SOLUTION INTRAVENOUS at 00:46

## 2018-08-06 RX ADMIN — DICYCLOMINE HYDROCHLORIDE 10 MG: 10 CAPSULE ORAL at 11:17

## 2018-08-06 RX ADMIN — LORAZEPAM 2 MG: 2 INJECTION INTRAMUSCULAR; INTRAVENOUS at 06:07

## 2018-08-06 RX ADMIN — LORAZEPAM 2 MG: 2 INJECTION INTRAMUSCULAR; INTRAVENOUS at 17:44

## 2018-08-06 RX ADMIN — ENOXAPARIN SODIUM 40 MG: 40 INJECTION SUBCUTANEOUS at 08:52

## 2018-08-06 RX ADMIN — Medication 10 ML: at 22:03

## 2018-08-06 RX ADMIN — TRAZODONE HYDROCHLORIDE 50 MG: 50 TABLET ORAL at 21:59

## 2018-08-06 RX ADMIN — DICYCLOMINE HYDROCHLORIDE 10 MG: 10 CAPSULE ORAL at 16:57

## 2018-08-06 RX ADMIN — DICYCLOMINE HYDROCHLORIDE 10 MG: 10 CAPSULE ORAL at 21:59

## 2018-08-06 RX ADMIN — DICYCLOMINE HYDROCHLORIDE 10 MG: 10 CAPSULE ORAL at 07:26

## 2018-08-06 RX ADMIN — ESCITALOPRAM OXALATE 20 MG: 10 TABLET ORAL at 08:52

## 2018-08-06 RX ADMIN — LORAZEPAM 2 MG: 2 INJECTION INTRAMUSCULAR; INTRAVENOUS at 23:14

## 2018-08-06 RX ADMIN — FOLIC ACID: 5 INJECTION, SOLUTION INTRAMUSCULAR; INTRAVENOUS; SUBCUTANEOUS at 08:18

## 2018-08-06 ASSESSMENT — PAIN SCALES - GENERAL
PAINLEVEL_OUTOF10: 0

## 2018-08-06 NOTE — PROGRESS NOTES
Spoke with Dr. Annemarie Gruber about patient mental status and great improvement from the night before. Patient remains alert, oriented, calm and cooperative although still very sleepy. Dr Annemarie Gruber stated \"it will be fine to begin titrating precedex gtt. \"  Will continue to monitor with CIWA scale as well.

## 2018-08-06 NOTE — PROGRESS NOTES
Hospitalist Progress Note  8/6/2018 9:49 AM  Subjective:   Admit Date: 8/4/2018  PCP: Isacc Pringle MD    Chief Complaint: EtOH w/d    Subjective: Patient seen and examined. Denies any acute complaints. Denies agitation/restlessness, hallucinations. No acute events per nursing overnight. Patient tolerating lower doses of precedex. Cumulative Hospital History:   08/04: Patient admitted for EtOH w/d. Extremely agitated with hallucinations overnight. Transferred to ICU, precedex started. 08/05: Patient significantly improved. Preccedex at 0.7. Continue to monitor in ICU. Will leave precedex at current rate for 24 hours and consider titrating down after 24 hours. 08/06: Precedex at 0.2. CIWA 0. Continue to titrate down and monitor on CIWA protocol. ROS: 14 point review of systems is negative except as specifically addressed above. DIET GENERAL;     Intake/Output Summary (Last 24 hours) at 08/06/18 0949  Last data filed at 08/06/18 0800   Gross per 24 hour   Intake          1971.82 ml   Output             1600 ml   Net           371.82 ml     Medications:   dexmedetomidine (PRECEDEX) IV infusion 0.2 mcg/kg/hr (08/06/18 0903)     Current Facility-Administered Medications   Medication Dose Route Frequency Provider Last Rate Last Dose    Rzfenpd-Soera-Bxcuoizv-TenofAF [id number] MG TABS 1 tablet  1 tablet Oral Daily Nicolasa Best MD   1 tablet at 08/06/18 4378    LORazepam (ATIVAN) injection 2 mg  2 mg Intravenous BID Nicolasa Best MD   2 mg at 08/06/18 9548    [START ON 8/7/2018] LORazepam (ATIVAN) injection 2 mg  2 mg Intravenous Once Nicolasa Best MD        sodium chloride flush 0.9 % injection 10 mL  10 mL Intravenous 2 times per day Nicolasa Best MD   10 mL at 08/05/18 2148    sodium chloride flush 0.9 % injection 10 mL  10 mL Intravenous PRN Nicolasa Best MD        enoxaparin (LOVENOX) injection 40 mg  40 mg Subcutaneous Daily Nicolasa Best MD   40 mg at 08/06/18 0852    sodium chloride 0.9 % 7,957 mL with folic acid 1 mg, adult multi-vitamin with vitamin k 10 mL, thiamine 100 mg   Intravenous Daily Ambar Martinez  mL/hr at 08/06/18 0818      [START ON 8/8/2018] vitamin B-1 (THIAMINE) tablet 100 mg  100 mg Oral Daily Ambar Martinez MD        [START ON 8/8/2018] thiamine (B-1) injection 100 mg  100 mg Intramuscular Daily Ambar Martinez MD        [START ON 9/3/7711] folic acid (FOLVITE) tablet 1 mg  1 mg Oral Daily Ambar Martinez MD        [START ON 8/8/2018] multivitamin 1 tablet  1 tablet Oral Daily Ambar Martinez MD        potassium chloride 10 mEq/100 mL IVPB (Peripheral Line)  10 mEq Intravenous PRN Ambar Martinez MD        magnesium sulfate 1 g in dextrose 5% 100 mL IVPB  1 g Intravenous PRN Ambar Martinez MD        nicotine (NICODERM CQ) 21 MG/24HR 1 patch  1 patch Transdermal Daily Ambar Martinez MD   1 patch at 08/06/18 0818    LORazepam (ATIVAN) tablet 1 mg  1 mg Oral Q1H PRN Ambar Martinez MD        Or    LORazepam (ATIVAN) injection 1 mg  1 mg Intravenous Q1H PRN Ambar Martinez MD        Or    LORazepam (ATIVAN) tablet 2 mg  2 mg Oral Q1H PRN Ambar Martinez MD        Or    LORazepam (ATIVAN) injection 2 mg  2 mg Intravenous Q1H PRN Ambar Martinez MD        Or    LORazepam (ATIVAN) tablet 3 mg  3 mg Oral Q1H PRN Ambar Martinez MD        Or    LORazepam (ATIVAN) injection 3 mg  3 mg Intravenous Q1H PRN Ambar Martinez MD        Or    LORazepam (ATIVAN) tablet 4 mg  4 mg Oral Q1H PRN Ambar Martinez MD        Or    LORazepam (ATIVAN) injection 4 mg  4 mg Intravenous Q1H PRN Ambar Martinez MD   4 mg at 08/04/18 2052    cloNIDine (CATAPRES) tablet 0.1 mg  0.1 mg Oral BID Ambar Martinez MD   0.1 mg at 08/05/18 2148    dicyclomine (BENTYL) capsule 10 mg  10 mg Oral 4x Daily AC & HS Ambar Martinez MD   10 mg at 08/06/18 0726    traZODone (DESYREL) tablet 50 mg  50 mg Oral Nightly Ambar Martinez MD   50 mg at 08/05/18 5727    cyclobenzaprine (FLEXERIL) tablet 10 mg Enoxaparin  GI (PUD) PPx: Not indicated  PT: indicated to prevent deconditioning as per critical illness    Hypokalemia:  Replete, continue to monitor    Advance Directive: Full Code    Discharge planning: TBD      Jinny Valerio MD  [facility]

## 2018-08-07 LAB
ALBUMIN SERPL-MCNC: 3.1 G/DL (ref 3.5–5.2)
ALP BLD-CCNC: 108 U/L (ref 40–130)
ALT SERPL-CCNC: 32 U/L (ref 5–41)
ANION GAP SERPL CALCULATED.3IONS-SCNC: 7 MMOL/L (ref 7–19)
AST SERPL-CCNC: 41 U/L (ref 5–40)
BILIRUB SERPL-MCNC: 0.4 MG/DL (ref 0.2–1.2)
BUN BLDV-MCNC: 7 MG/DL (ref 6–20)
CALCIUM SERPL-MCNC: 8.3 MG/DL (ref 8.6–10)
CHLORIDE BLD-SCNC: 105 MMOL/L (ref 98–111)
CO2: 30 MMOL/L (ref 22–29)
CREAT SERPL-MCNC: 0.6 MG/DL (ref 0.5–1.2)
GFR NON-AFRICAN AMERICAN: >60
GLUCOSE BLD-MCNC: 116 MG/DL (ref 74–109)
HCT VFR BLD CALC: 33.1 % (ref 42–52)
HEMOGLOBIN: 10.4 G/DL (ref 14–18)
MCH RBC QN AUTO: 36.9 PG (ref 27–31)
MCHC RBC AUTO-ENTMCNC: 31.4 G/DL (ref 33–37)
MCV RBC AUTO: 117.4 FL (ref 80–94)
PDW BLD-RTO: 12.4 % (ref 11.5–14.5)
PLATELET # BLD: 277 K/UL (ref 130–400)
PMV BLD AUTO: 9.5 FL (ref 9.4–12.4)
POTASSIUM SERPL-SCNC: 3.8 MMOL/L (ref 3.5–5)
RBC # BLD: 2.82 M/UL (ref 4.7–6.1)
SODIUM BLD-SCNC: 142 MMOL/L (ref 136–145)
TOTAL PROTEIN: 5.7 G/DL (ref 6.6–8.7)
WBC # BLD: 7 K/UL (ref 4.8–10.8)

## 2018-08-07 PROCEDURE — 80053 COMPREHEN METABOLIC PANEL: CPT

## 2018-08-07 PROCEDURE — 85027 COMPLETE CBC AUTOMATED: CPT

## 2018-08-07 PROCEDURE — 6360000002 HC RX W HCPCS: Performed by: HOSPITALIST

## 2018-08-07 PROCEDURE — 99231 SBSQ HOSP IP/OBS SF/LOW 25: CPT | Performed by: INTERNAL MEDICINE

## 2018-08-07 PROCEDURE — 6370000000 HC RX 637 (ALT 250 FOR IP): Performed by: HOSPITALIST

## 2018-08-07 PROCEDURE — 2580000003 HC RX 258: Performed by: HOSPITALIST

## 2018-08-07 PROCEDURE — 1210000000 HC MED SURG R&B

## 2018-08-07 PROCEDURE — 36415 COLL VENOUS BLD VENIPUNCTURE: CPT

## 2018-08-07 PROCEDURE — 2500000003 HC RX 250 WO HCPCS: Performed by: HOSPITALIST

## 2018-08-07 RX ADMIN — ESCITALOPRAM OXALATE 20 MG: 10 TABLET ORAL at 08:05

## 2018-08-07 RX ADMIN — TRAZODONE HYDROCHLORIDE 50 MG: 50 TABLET ORAL at 22:16

## 2018-08-07 RX ADMIN — FOLIC ACID: 5 INJECTION, SOLUTION INTRAMUSCULAR; INTRAVENOUS; SUBCUTANEOUS at 08:06

## 2018-08-07 RX ADMIN — Medication 10 ML: at 22:19

## 2018-08-07 RX ADMIN — ENOXAPARIN SODIUM 40 MG: 40 INJECTION SUBCUTANEOUS at 08:04

## 2018-08-07 RX ADMIN — DICYCLOMINE HYDROCHLORIDE 10 MG: 10 CAPSULE ORAL at 17:07

## 2018-08-07 RX ADMIN — CLONIDINE HYDROCHLORIDE 0.1 MG: 0.1 TABLET ORAL at 08:05

## 2018-08-07 RX ADMIN — DICYCLOMINE HYDROCHLORIDE 10 MG: 10 CAPSULE ORAL at 06:47

## 2018-08-07 RX ADMIN — DICYCLOMINE HYDROCHLORIDE 10 MG: 10 CAPSULE ORAL at 11:50

## 2018-08-07 RX ADMIN — CLONIDINE HYDROCHLORIDE 0.1 MG: 0.1 TABLET ORAL at 22:16

## 2018-08-07 RX ADMIN — DICYCLOMINE HYDROCHLORIDE 10 MG: 10 CAPSULE ORAL at 22:16

## 2018-08-07 RX ADMIN — LORAZEPAM 1 MG: 1 TABLET ORAL at 08:13

## 2018-08-07 RX ADMIN — LORAZEPAM 1 MG: 2 INJECTION INTRAMUSCULAR; INTRAVENOUS at 22:16

## 2018-08-07 NOTE — PLAN OF CARE
Problem: Restraint Use - Nonviolent/Non-Self-Destructive Behavior:  Goal: Absence of restraint indications  Absence of restraint indications   Outcome: Ongoing    Goal: Absence of restraint-related injury  Absence of restraint-related injury   Outcome: Ongoing      Problem: Falls - Risk of:  Goal: Will remain free from falls  Will remain free from falls   Outcome: Ongoing    Goal: Absence of physical injury  Absence of physical injury   Outcome: Ongoing      Problem: Discharge Planning:  Goal: Discharged to appropriate level of care  Discharged to appropriate level of care  Outcome: Ongoing      Problem: Nutrition Deficit:  Goal: Ability to achieve adequate nutritional intake will improve  Ability to achieve adequate nutritional intake will improve  Outcome: Ongoing      Problem: Sleep Pattern Disturbance:  Goal: Appears well-rested  Appears well-rested  Outcome: Ongoing

## 2018-08-07 NOTE — PROGRESS NOTES
CONSULTATION NOTE :ID       Patient - Kamlesh Ayala,  Age - 57 y.o.    - 1970      Room Number - 9441/712-56   MRN -  [id number]   Acct # - [de-identified]  Date of Admission -  2018  1:37 PM  Patient's PCP: Elvie Smart MD     Requesting Physician: Vaishali Yeboah MD    REASON FOR CONSULTATION    HIV management    HISTORY OF PRESENT ILLNESS       This is a very pleasant 57 y.o. male who was admitted to the hospital with a chief complaints of  alcohol withdrawal. [facility]. He had been there for approximately 3 days when he developed some agitation and hallucinations and was brought to the emergency department. Patient reports that they had medication there and they were giving him Ativan, Zofran, some other medications that \"it just wasn't enough\". Aeruginosa that he really wasn't drinking up until the time he went and however he did have his last drink 2 days before going in. He said that he actually drank so much that he passed out and he overslept for work. He reports when he called his boss he then realized that he cannot do it anymore alone. [facility] plans [facility]. He reports they have a bed hold. His only concern is that he usually takes his genvoya in the evening and that's the way that they were giving it at [facility]. He has been getting it in the morning here. PAST MEDICAL AND SURGICAL HISTORY       Past Medical History:   Diagnosis Date    ETOH abuse     HIV (human immunodeficiency virus infection) ([facility] [address]     Smoker        History reviewed. No pertinent surgical history.       MEDICATIONS :       Scheduled Meds:   Bexsrqc-Lbxqn-Popmqpir-TenofAF  1 tablet Oral Daily    sodium chloride flush  10 mL Intravenous 2 times per day    enoxaparin  40 mg Subcutaneous Daily    [START ON 2018] thiamine  100 mg Oral Daily    [START ON 2018] thiamine  100 mg Intramuscular Daily    [START ON 2018] ,S1S2, no murmur or gallop. Abdomen:  Soft, non tender to palpation, BS positive  Extremities: No pretibial edema  Skin:  Warm and dry. CNS: Moves all extremities, speech clear, follows commands  PSYCH: alert, pleasant and cooperative. IV access:  Peripheral IV ×2 wrist and forearm right, condition patent and no redness         LABS:     CBC with DIFF:   Recent Labs      08/05/18   0125  08/06/18   0123  08/07/18   0317   WBC  8.4  7.1  7.0   RBC  2.73*  2.95*  2.82*   HGB  9.9*  10.9*  10.4*   HCT  31.6*  33.7*  33.1*   MCV  115.8*  114.2*  117.4*   MCH  36.3*  36.9*  36.9*   MCHC  31.3*  32.3*  31.4*   RDW  13.3  12.7  12.4   PLT  253  253  277   MPV  9.3*  9.5  9.5   NEUTOPHILPCT  58.2   --    --    LYMPHOPCT  23.4   --    --    MONOPCT  6.9   --    --    EOSRELPCT  7.7*   --    --    BASOPCT  1.4*   --    --    NEUTROABS  4.9   --    --    LYMPHSABS  2.0   --    --    MONOSABS  0.60   --    --    EOSABS  0.70*   --    --    BASOSABS  0.10   --    --        CMP/BMP:  Recent Labs      08/05/18   0125  08/06/18   0123  08/07/18   0317   NA  138  136  142   K  3.4*  4.4  3.8   CL  103  101  105   CO2  24  22  30*   ANIONGAP  11  13  7   GLUCOSE  99  162*  116*   BUN  5*  7  7   CREATININE  <0.5  0.5  0.6   LABGLOM  >60  >60  >60   CALCIUM  7.7*  7.8*  8.3*   PROT  5.9*  5.8*  5.7*   LABALBU  3.2*  3.2*  3.1*   BILITOT  0.6  0.6  0.4   ALKPHOS  103  98  108   ALT  28  30  32   AST  45*  44*  41*         IMAGING:   CTA PULMONARY W CONTRAST [[id number]] Resulted: 08/04/18 1939      Order Status: Completed Updated: 08/04/18 1841     Narrative:       CTA PULMONARY W CONTRAST 8/4/2018 5:00 PM  HISTORY: Hypoxia and confusion  COMPARISON: None. DLP: 448 mGy cm. In order to have a CT radiation dose as low as  reasonably achievable, Automated Exposure Control was utilized for  adjustment of the mA and/or KV according to patient size.   TECHNIQUE: Helical tomographic images of the chest were obtained after  the administration of intravenous contrast following angiogram  protocol. Additionally, 3D MIP reconstructions in the coronal and  sagittal planes were provided.     FINDINGS:    Pulmonary arteries: There is adequate enhancement of the pulmonary  arteries to evaluate for central and segmental pulmonary emboli. There  are no filling defects within the main, lobar, segmental or visualized  subsegmental pulmonary arteries. The pulmonary vessels are within  normal limits for size. Aorta and great vessels: The aorta is well opacified and demonstrates  no aneurysm, stenosis or dissection. The great vessels are normal in  appearance. No coronary artery calcifications are visualized. Neck base: The imaged portion of the base of the neck appears  unremarkable. Lungs: Centrilobular and paraseptal emphysematous changes are present  in bilateral lungs. These are greater than expected given the  patient's age. Dependent changes are also seen in both lung bases. The  trachea and bronchial tree are patent. Heart: The heart is normal in size. There is no pericardial effusion. Lymph nodes: No pathologically enlarged mediastinal, hilar, or  axillary lymph nodes are present. Bones and soft tissues: The osseous structures of the thorax and  surrounding soft tissues demonstrate no acute process. Upper abdomen: There is evidence of chronic pancreatitis. Diffuse  calcifications are seen throughout the pancreas. Fatty liver disease  is present.      Impression:       1.  No evidence of pulmonary embolus. 2.  Bilateral emphysematous changes are greater than expected given  the patient's age. Consider alpha-1 antitrypsin deficiency if the  patient is not a heavy smoker. 3.  Fatty liver disease and chronic pancreatitis. Signed by Dr Tiara Salinas on 8/4/2018 6:39 PM     >>>>>>>>>>>>>personally reviewed. ajf        ASSESSMENT AND PLAN     Patient Active Problem List   Diagnosis    Alcohol withdrawal delirium ([facility] [address]    Hypokalemia

## 2018-08-07 NOTE — PROGRESS NOTES
Hospitalist Progress Note  8/7/2018 11:02 AM  Subjective:   Admit Date: 8/4/2018  PCP: Denise John MD    Chief Complaint: EtOH w/d    Subjective: No acute complaints. No acute events overnight. Patient states that he feels well, however still requiring ativan on CIWA scale. Cumulative Hospital History:   08/04: Patient admitted for EtOH w/d. Extremely agitated with hallucinations overnight. Transferred to ICU, precedex started. 08/05: Patient significantly improved. Preccedex at 0.7. Continue to monitor in ICU. Will leave precedex at current rate for 24 hours and consider titrating down after 24 hours. 08/06: Precedex at 0.2. CIWA 0. Continue to titrate down and monitor on CIWA protocol. 08/07: Patient doing well on floor on CIWA protocol. Received ativan overnight and again this AM.  CIWA 3 this AM    ROS: 14 point review of systems is negative except as specifically addressed above. DIET GENERAL;     Intake/Output Summary (Last 24 hours) at 08/07/18 1102  Last data filed at 08/07/18 5218   Gross per 24 hour   Intake           1453.1 ml   Output              325 ml   Net           1128.1 ml     Medications:    Current Facility-Administered Medications   Medication Dose Route Frequency Provider Last Rate Last Dose    Fgsrokk-Whkvd-Ovevqhbo-TenofAF [id number] MG TABS 1 tablet  1 tablet Oral Daily Jojo Greco MD   1 tablet at 08/07/18 0805    sodium chloride flush 0.9 % injection 10 mL  10 mL Intravenous 2 times per day Jojo Greco MD   10 mL at 08/06/18 2203    sodium chloride flush 0.9 % injection 10 mL  10 mL Intravenous PRN Jojo Greco MD        enoxaparin (LOVENOX) injection 40 mg  40 mg Subcutaneous Daily Jojo Greco MD   40 mg at 08/07/18 0804    [START ON 8/8/2018] vitamin B-1 (THIAMINE) tablet 100 mg  100 mg Oral Daily Jojo Greco MD        [START ON 8/8/2018] thiamine (B-1) injection 100 mg  100 mg Intramuscular Daily Jojo Greco MD        [START ON 8/8/2018] folic acid (FOLVITE) tablet 1 mg  1 mg Oral Daily Darrell Daniel MD        [START ON 8/8/2018] multivitamin 1 tablet  1 tablet Oral Daily Darrell Daniel MD        potassium chloride 10 mEq/100 mL IVPB (Peripheral Line)  10 mEq Intravenous PRN Darrell Daniel MD        magnesium sulfate 1 g in dextrose 5% 100 mL IVPB  1 g Intravenous PRN Darrell Daniel MD        nicotine (NICODERM CQ) 21 MG/24HR 1 patch  1 patch Transdermal Daily Darrell Daniel MD   1 patch at 08/07/18 0805    LORazepam (ATIVAN) tablet 1 mg  1 mg Oral Q1H PRN Darrell Daniel MD   1 mg at 08/07/18 0813    Or    LORazepam (ATIVAN) injection 1 mg  1 mg Intravenous Q1H PRN Darrell Daniel MD        Or    LORazepam (ATIVAN) tablet 2 mg  2 mg Oral Q1H PRN Darrell Daniel MD        Or    LORazepam (ATIVAN) injection 2 mg  2 mg Intravenous Q1H PRN Darrell Daniel MD        Or    LORazepam (ATIVAN) tablet 3 mg  3 mg Oral Q1H PRN Darrell Daniel MD        Or    LORazepam (ATIVAN) injection 3 mg  3 mg Intravenous Q1H PRN Darrell Daniel MD        Or    LORazepam (ATIVAN) tablet 4 mg  4 mg Oral Q1H PRN Darrell Daniel MD        Or    LORazepam (ATIVAN) injection 4 mg  4 mg Intravenous Q1H PRN Darrell Daniel MD   4 mg at 08/04/18 2052    cloNIDine (CATAPRES) tablet 0.1 mg  0.1 mg Oral BID Darrell Daniel MD   0.1 mg at 08/07/18 0805    dicyclomine (BENTYL) capsule 10 mg  10 mg Oral 4x Daily AC & HS Darrell Daniel MD   10 mg at 08/07/18 5132    traZODone (DESYREL) tablet 50 mg  50 mg Oral Nightly Darrell Daniel MD   50 mg at 08/06/18 2159    cyclobenzaprine (FLEXERIL) tablet 10 mg  10 mg Oral TID PRN Darrell Daniel MD        hydrOXYzine (VISTARIL) capsule 25 mg  25 mg Oral TID PRN Darrell Daniel MD        escitalopram (LEXAPRO) tablet 20 mg  20 mg Oral Daily Darrell Daniel MD   20 mg at 08/07/18 0805    polyethylene glycol (GLYCOLAX) packet 17 g  17 g Oral Daily PRN Darrell Daniel MD        ondansetron [facility]) injection 4 mg  4 mg Intravenous Q6H PRN Tank Rodriguez MD            Labs:     Recent Labs      08/05/18   0125  08/06/18   0123  08/07/18   0317   WBC  8.4  7.1  7.0   RBC  2.73*  2.95*  2.82*   HGB  9.9*  10.9*  10.4*   HCT  31.6*  33.7*  33.1*   MCV  115.8*  114.2*  117.4*   MCH  36.3*  36.9*  36.9*   MCHC  31.3*  32.3*  31.4*   PLT  253  253  277     Recent Labs      08/05/18   0125  08/06/18   0123  08/07/18   0317   NA  138  136  142   K  3.4*  4.4  3.8   ANIONGAP  11  13  7   CL  103  101  105   CO2  24  22  30*   BUN  5*  7  7   CREATININE  <0.5  0.5  0.6   GLUCOSE  99  162*  116*   CALCIUM  7.7*  7.8*  8.3*     Recent Labs      08/04/18   1414  08/05/18   0125   MG  2.0  2.0   PHOS  4.2   --      Recent Labs      08/05/18   0125  08/06/18   0123  08/07/18   0317   AST  45*  44*  41*   ALT  28  30  32   BILITOT  0.6  0.6  0.4   ALKPHOS  103  98  108     ABGs:  Recent Labs      08/04/18   1547   PHART  7.420   RSG7WHK  38.0   PO2ART  84.0   PJA0GAV  24.6   BEART  0.2   HGBAE  10.5*   [id number]  89.2*   CARBOXHGBART  3.1   02THERAPY  Unknown     HgBA1c: Invalid input(s): HGBA1C  FLP:  No results found for: TRIG, HDL, LDLCALC, LDLDIRECT, LABVLDL  TSH:  No results found for: TSH  Troponin T: No results for input(s): TROPONINI in the last 72 hours. INR: No results for input(s): INR in the last 72 hours.     Objective:   Vitals: /68   Pulse 77   Temp 98 °F (36.7 °C) (Temporal)   Resp 16   Ht 5' 8\" (1.727 m)   Wt 123 lb 4.8 oz (55.9 kg)   SpO2 91%   BMI 18.75 kg/m²   24HR INTAKE/OUTPUT:      Intake/Output Summary (Last 24 hours) at 08/07/18 1102  Last data filed at 08/07/18 0921   Gross per 24 hour   Intake           1453.1 ml   Output              325 ml   Net           1128.1 ml     General appearance: alert and cooperative with exam  HEENT: atraumatic, eyes with clear conjunctiva and normal lids, pupils and irises normal, external ears and nose are normal, lips normal.  Neck without masses, lympadenopathy, bruit, thyroid normal  Lungs: no increased work of breathing, \"clear to auscultation bilaterally\" without rales, rhonchi or wheezes  Heart: regular rate and rhythm, S1, S2 normal, no murmur, click, rub or gallop  Abdomen: soft, non-tender; bowel sounds normal; no masses,  no organomegaly  Extremities: extremities normal, atraumatic, no cyanosis or edema  Neurologic: normal sensation, alert and oriented, slow to respond. Pupils with sluggish, minimal response to light. Moves all 4 extremities without difficulty. Follows commands, answers appropriately. Skin: no rashes, nodules.   CIWA 3  Assessment and Plan:      Alcohol withdrawal delirium (HCC)     Ativan taper  Ativan CIWA Protocol  Zofran PRN  Banna Bag in ED, continue X 3, then PO MV with Thiamine with Folic Acid  Continue to monitor CMP     Tobacco abuse:  nicoderm patch  niotine product PO PRN     Mild Anemia, Transamninitis, Mild Hyponatremia: incidental findings  Will monitor  Hyponatremia resolved     HIV:   Last viral load and CD4 count unknown  Continue Genvoya 1 tablet PO QDay  Consult ID, appreciate recs moving forward     Depression with Anxiety, Insomnia:  Continue Trazodone  Continue Escitalopram     HTN:   Continue Catapres     Suspect IBD -per med rec:  Continue Bentyl     DVT PPx: Enoxaparin  GI (PUD) PPx: Not indicated  PT: indicated to prevent deconditioning as per critical illness    Hypokalemia:  Replete, continue to monitor    Advance Directive: Full Code    Discharge planning: TBD      India Mueller MD  [facility] Hospitalist

## 2018-08-07 NOTE — PROGRESS NOTES
Katiana Mckeon received from ICU to room # 413 . Mental Status: Patient is oriented, alert, coherent, logical, thought processes intact and able to concentrate and follow conversation. Vitals:    08/06/18 2252   BP: 123/79   Pulse: 93   Resp: 16   Temp: 98.1 °F (36.7 °C)   SpO2: 95%     Placed on cardiac monitor: No.  Belongings: Dentures upper and lower with patient at bedside . Family at bedside No.  Oriented Patient to room. Call light within reach. Yes. Transfer was: Well tolerated by patient. .    Electronically signed by Jimmy Larry RN on 8/6/2018 at 11:44 PM

## 2018-08-08 VITALS
BODY MASS INDEX: 18.69 KG/M2 | HEART RATE: 71 BPM | DIASTOLIC BLOOD PRESSURE: 61 MMHG | RESPIRATION RATE: 18 BRPM | SYSTOLIC BLOOD PRESSURE: 99 MMHG | OXYGEN SATURATION: 93 % | TEMPERATURE: 98 F | WEIGHT: 123.3 LBS | HEIGHT: 68 IN

## 2018-08-08 PROBLEM — F10.931 ALCOHOL WITHDRAWAL DELIRIUM (HCC): Status: RESOLVED | Noted: 2018-08-04 | Resolved: 2018-08-08

## 2018-08-08 PROBLEM — E87.6 HYPOKALEMIA: Status: RESOLVED | Noted: 2018-08-05 | Resolved: 2018-08-08

## 2018-08-08 LAB
ALBUMIN SERPL-MCNC: 3.3 G/DL (ref 3.5–5.2)
ALP BLD-CCNC: 111 U/L (ref 40–130)
ALT SERPL-CCNC: 46 U/L (ref 5–41)
ANION GAP SERPL CALCULATED.3IONS-SCNC: 10 MMOL/L (ref 7–19)
AST SERPL-CCNC: 63 U/L (ref 5–40)
BILIRUB SERPL-MCNC: 0.4 MG/DL (ref 0.2–1.2)
BUN BLDV-MCNC: 6 MG/DL (ref 6–20)
CALCIUM SERPL-MCNC: 8.5 MG/DL (ref 8.6–10)
CHLORIDE BLD-SCNC: 100 MMOL/L (ref 98–111)
CO2: 28 MMOL/L (ref 22–29)
CREAT SERPL-MCNC: 0.6 MG/DL (ref 0.5–1.2)
FOLATE: 17.4 NG/ML (ref 4.5–32.2)
GFR NON-AFRICAN AMERICAN: >60
GLUCOSE BLD-MCNC: 137 MG/DL (ref 74–109)
HCT VFR BLD CALC: 33 % (ref 42–52)
HEMOGLOBIN: 10.5 G/DL (ref 14–18)
IRON SATURATION: 52 % (ref 14–50)
IRON: 106 UG/DL (ref 59–158)
MCH RBC QN AUTO: 36.8 PG (ref 27–31)
MCHC RBC AUTO-ENTMCNC: 31.8 G/DL (ref 33–37)
MCV RBC AUTO: 115.8 FL (ref 80–94)
PDW BLD-RTO: 11.9 % (ref 11.5–14.5)
PLATELET # BLD: 257 K/UL (ref 130–400)
PMV BLD AUTO: 9.8 FL (ref 9.4–12.4)
POTASSIUM SERPL-SCNC: 3.8 MMOL/L (ref 3.5–5)
RBC # BLD: 2.85 M/UL (ref 4.7–6.1)
SODIUM BLD-SCNC: 138 MMOL/L (ref 136–145)
TOTAL IRON BINDING CAPACITY: 204 UG/DL (ref 250–400)
TOTAL PROTEIN: 6.1 G/DL (ref 6.6–8.7)
VITAMIN B-12: 613 PG/ML (ref 211–946)
WBC # BLD: 7.1 K/UL (ref 4.8–10.8)
ZINC: 60 UG/DL (ref 60–120)

## 2018-08-08 PROCEDURE — 80053 COMPREHEN METABOLIC PANEL: CPT

## 2018-08-08 PROCEDURE — 6360000002 HC RX W HCPCS: Performed by: HOSPITALIST

## 2018-08-08 PROCEDURE — 85027 COMPLETE CBC AUTOMATED: CPT

## 2018-08-08 PROCEDURE — 82746 ASSAY OF FOLIC ACID SERUM: CPT

## 2018-08-08 PROCEDURE — 83540 ASSAY OF IRON: CPT

## 2018-08-08 PROCEDURE — 99239 HOSP IP/OBS DSCHRG MGMT >30: CPT | Performed by: FAMILY MEDICINE

## 2018-08-08 PROCEDURE — 36415 COLL VENOUS BLD VENIPUNCTURE: CPT

## 2018-08-08 PROCEDURE — 83550 IRON BINDING TEST: CPT

## 2018-08-08 PROCEDURE — 6370000000 HC RX 637 (ALT 250 FOR IP): Performed by: HOSPITALIST

## 2018-08-08 PROCEDURE — 2580000003 HC RX 258: Performed by: HOSPITALIST

## 2018-08-08 PROCEDURE — 82607 VITAMIN B-12: CPT

## 2018-08-08 RX ORDER — MULTIVITAMIN WITH FOLIC ACID 400 MCG
1 TABLET ORAL DAILY
Qty: 30 TABLET | Refills: 0 | Status: SHIPPED | OUTPATIENT
Start: 2018-08-09

## 2018-08-08 RX ORDER — LANOLIN ALCOHOL/MO/W.PET/CERES
100 CREAM (GRAM) TOPICAL DAILY
Qty: 30 TABLET | Refills: 0 | Status: SHIPPED | OUTPATIENT
Start: 2018-08-09

## 2018-08-08 RX ORDER — NICOTINE 21 MG/24HR
1 PATCH, TRANSDERMAL 24 HOURS TRANSDERMAL DAILY
Qty: 30 PATCH | Refills: 0 | Status: SHIPPED | OUTPATIENT
Start: 2018-08-09

## 2018-08-08 RX ORDER — FOLIC ACID 1 MG/1
1 TABLET ORAL DAILY
Qty: 30 TABLET | Refills: 0 | Status: SHIPPED | OUTPATIENT
Start: 2018-08-09

## 2018-08-08 RX ADMIN — THERA TABS 1 TABLET: TAB at 09:24

## 2018-08-08 RX ADMIN — LORAZEPAM 1 MG: 2 INJECTION INTRAMUSCULAR; INTRAVENOUS at 10:20

## 2018-08-08 RX ADMIN — DICYCLOMINE HYDROCHLORIDE 10 MG: 10 CAPSULE ORAL at 06:28

## 2018-08-08 RX ADMIN — THIAMINE HYDROCHLORIDE 100 MG: 100 INJECTION, SOLUTION INTRAMUSCULAR; INTRAVENOUS at 09:39

## 2018-08-08 RX ADMIN — DICYCLOMINE HYDROCHLORIDE 10 MG: 10 CAPSULE ORAL at 09:24

## 2018-08-08 RX ADMIN — FOLIC ACID 1 MG: 1 TABLET ORAL at 09:25

## 2018-08-08 RX ADMIN — ESCITALOPRAM OXALATE 20 MG: 10 TABLET ORAL at 09:24

## 2018-08-08 RX ADMIN — ENOXAPARIN SODIUM 40 MG: 40 INJECTION SUBCUTANEOUS at 09:32

## 2018-08-08 RX ADMIN — Medication 100 MG: at 09:25

## 2018-08-08 RX ADMIN — CLONIDINE HYDROCHLORIDE 0.1 MG: 0.1 TABLET ORAL at 09:24

## 2018-08-08 RX ADMIN — DICYCLOMINE HYDROCHLORIDE 10 MG: 10 CAPSULE ORAL at 16:53

## 2018-08-08 RX ADMIN — Medication 10 ML: at 09:44

## 2018-08-08 ASSESSMENT — ENCOUNTER SYMPTOMS: DIARRHEA: 0

## 2018-08-08 NOTE — PROGRESS NOTES
Discharge summary faxed to [facility] at [phone]    Electronically signed by Maty Aquino RN on 8/8/18 at 5:59 PM

## 2018-08-08 NOTE — PROGRESS NOTES
INFECTIOUS DISEASES PROGRESS NOTE    Patient:  Alisa Samuel  YOB: 1970  MRN: [id number]   Admit date: 8/4/2018   Admitting Physician: Ashley Razo DO  Primary Care Physician: Kalyan Connolly MD    CHIEF COMPLAINT: anxious for discharge      Interval History:  [facility] is [facility]. He has been discharged by the attending and when I called the clinic to set up appointment, someone else had already put in a follow up for me. Patient has lots of questions about clonidine - didn't take before.   Iron - saturation is high ( iron supplement stopped) on MVI with iron - called his pharmacy and his MVI has 27 mg  Asked about bentyl - has had solid bowl movements and eating well    Would like to continue nicotine patches       Allergies: No Known Allergies    Current Meds:   Akutpqk-Uqdam-Ptrasyqz-TenofAF [id number] MG TABS 1 tablet Daily   sodium chloride flush 0.9 % injection 10 mL 2 times per day   sodium chloride flush 0.9 % injection 10 mL PRN   enoxaparin (LOVENOX) injection 40 mg Daily   vitamin B-1 (THIAMINE) tablet 100 mg Daily   thiamine (B-1) injection 244 mg Daily   folic acid (FOLVITE) tablet 1 mg Daily   multivitamin 1 tablet Daily   potassium chloride 10 mEq/100 mL IVPB (Peripheral Line) PRN   magnesium sulfate 1 g in dextrose 5% 100 mL IVPB PRN   nicotine (NICODERM CQ) 21 MG/24HR 1 patch Daily   LORazepam (ATIVAN) tablet 1 mg Q1H PRN   Or    LORazepam (ATIVAN) injection 1 mg Q1H PRN   Or    LORazepam (ATIVAN) tablet 2 mg Q1H PRN   Or    LORazepam (ATIVAN) injection 2 mg Q1H PRN   Or    LORazepam (ATIVAN) tablet 3 mg Q1H PRN   Or    LORazepam (ATIVAN) injection 3 mg Q1H PRN   Or    LORazepam (ATIVAN) tablet 4 mg Q1H PRN   Or    LORazepam (ATIVAN) injection 4 mg Q1H PRN   cloNIDine (CATAPRES) tablet 0.1 mg BID   dicyclomine (BENTYL) capsule 10 mg 4x Daily AC & HS   traZODone (DESYREL) tablet 50 mg Nightly   cyclobenzaprine (FLEXERIL) tablet 10 mg TID PRN hydrOXYzine (VISTARIL) capsule 25 mg TID PRN   escitalopram (LEXAPRO) tablet 20 mg Daily   polyethylene glycol (GLYCOLAX) packet 17 g Daily PRN   ondansetron (ZOFRAN) injection 4 mg Q6H PRN       Review of Systems   Constitutional: Negative for fever. Gastrointestinal: Negative for diarrhea. Vital Signs:  BP 99/61   Pulse 71   Temp 98 °F (36.7 °C) (Temporal)   Resp 18   Ht 5' 8\" (1.727 m)   Wt 123 lb 4.8 oz (55.9 kg)   SpO2 93%   BMI 18.75 kg/m²   Temp (24hrs), Av.4 °F (36.9 °C), Min:98 °F (36.7 °C), Max:98.6 °F (37 °C)      Physical Exam   Constitutional: He is oriented to person, place, and time. No distress. Neck: Neck supple. Cardiovascular: Normal rate and regular rhythm. Pulmonary/Chest: Effort normal and breath sounds normal.   Abdominal: Soft. Bowel sounds are normal. There is no rebound and no guarding. Musculoskeletal: He exhibits no edema. Neurological: He is alert and oriented to person, place, and time. Skin: Skin is warm and dry. He is not diaphoretic. Psychiatric: He has a normal mood and affect. Line/IV site: No erythema, warmth, induration, or tenderness.     LAB RESULTS:    CBC with DIFF: Recent Labs      18   0123  187  18   0329   WBC  7.1  7.0  7.1   RBC  2.95*  2.82*  2.85*   HGB  10.9*  10.4*  10.5*   HCT  33.7*  33.1*  33.0*   MCV  114.2*  117.4*  115.8*   MCH  36.9*  36.9*  36.8*   MCHC  32.3*  31.4*  31.8*   RDW  12.7  12.4  11.9   PLT  253  277  257   MPV  9.5  9.5  9.8       CMP/BMP:  Recent Labs      18   0123  18   0317  18   0329   NA  136  142  138   K  4.4  3.8  3.8   CL  101  105  100   CO2  22  30*  28   ANIONGAP  13  7  10   GLUCOSE  162*  116*  137*   BUN  7  7  6   CREATININE  0.5  0.6  0.6   LABGLOM  >60  >60  >60   CALCIUM  7.8*  8.3*  8.5*   PROT  5.8*  5.7*  6.1*   LABALBU  3.2*  3.1*  3.3*   BILITOT  0.6  0.4  0.4   ALKPHOS  98  108  111   ALT  30  32  46*   AST  44*  41*  63* Culture Results:    No results for input(s): CXSURG in the last 720 hours. Blood Culture Recent: No results for input(s): BC in the last 720 hours. Patient Active Problem List   Diagnosis    Anemia       IMPRESSION:     57-year-old gentleman with history of alcohol abuse [facility] and subsequently went through alcohol withdrawal.  Per his report he received some medication but it was \"not enough\".  Now has recovered after stay in ICU then on floor     Alcohol abuse    Anemia - Fe sat in 52 %    B12 and folate normal    Tobacco abuse/ emphysema         RECOMMENDATIONS :  · Ok for dc     · Continue genvoya - pt to take at 3 pm tomorrow then get back to regular 5 pm time on Friday    · No additional iron - he can take his current MVI and will change to no iron MVI when seen in office ( on ly 27 mg)     · Follow up anemia as outpatient    · Continue with nicotine patches - d/w patient normal wean - continue on 21 mg for now        Jose Raul Ott MD

## 2018-08-08 NOTE — PROGRESS NOTES
Pt's [place] retrieved from patient specific bin in the omnicell and returned to pt    Electronically signed by Jg Hallman RN on 8/8/18 at 5:04 PM

## 2018-08-08 NOTE — DISCHARGE SUMMARY
Automated Exposure Control was utilized for adjustment of the mA and/or KV according to patient size. TECHNIQUE: Helical tomographic images of the chest were obtained after the administration of intravenous contrast following angiogram protocol. Additionally, 3D MIP reconstructions in the coronal and sagittal planes were provided. FINDINGS:  Pulmonary arteries: There is adequate enhancement of the pulmonary arteries to evaluate for central and segmental pulmonary emboli. There are no filling defects within the main, lobar, segmental or visualized subsegmental pulmonary arteries. The pulmonary vessels are within normal limits for size. Aorta and great vessels: The aorta is well opacified and demonstrates no aneurysm, stenosis or dissection. The great vessels are normal in appearance. No coronary artery calcifications are visualized. Neck base: The imaged portion of the base of the neck appears unremarkable. Lungs: Centrilobular and paraseptal emphysematous changes are present in bilateral lungs. These are greater than expected given the patient's age. Dependent changes are also seen in both lung bases. The trachea and bronchial tree are patent. Heart: The heart is normal in size. There is no pericardial effusion. Lymph nodes: No pathologically enlarged mediastinal, hilar, or axillary lymph nodes are present. Bones and soft tissues: The osseous structures of the thorax and surrounding soft tissues demonstrate no acute process. Upper abdomen: There is evidence of chronic pancreatitis. Diffuse calcifications are seen throughout the pancreas. Fatty liver disease is present. 1.  No evidence of pulmonary embolus. 2.  Bilateral emphysematous changes are greater than expected given the patient's age. Consider alpha-1 antitrypsin deficiency if the patient is not a heavy smoker. 3.  Fatty liver disease and chronic pancreatitis.  Signed by Dr Parish Moore on 8/4/2018 6:39 PM      Pertinent Labs:   CBC:   Recent Labs 08/06/18   0123  08/07/18 0317 08/08/18 0329   WBC  7.1  7.0  7.1   HGB  10.9*  10.4*  10.5*   PLT  253  277  257     BMP:    Recent Labs      08/06/18   0123  08/07/18 0317 08/08/18 0329   NA  136  142  138   K  4.4  3.8  3.8   CL  101  105  100   CO2  22  30*  28   BUN  7  7  6   CREATININE  0.5  0.6  0.6   GLUCOSE  162*  116*  137*     INR: No results for input(s): INR in the last 72 hours. Lipids: No results for input(s): CHOL, HDL in the last 72 hours. Invalid input(s): LDLCALCU  ABGs:No results for input(s): PHART, EUM7NFT, PO2ART, KKR4HFA, BEART, HGBAE, Z6BZJMDC, CARBOXHGBART, 02THERAPY in the last 72 hours. HgBA1c:  No components found for: HEMOGLOBIN A1C    Procedures: None performed. Hospital Course:   08/04: Patient admitted for EtOH w/d. Extremely agitated with hallucinations overnight. Transferred to ICU, precedex started. 08/05: Patient significantly improved. Preccedex at 0.7. Continue to monitor in ICU. Will leave precedex at current rate for 24 hours and consider titrating down after 24 hours. 08/06: Precedex at 0.2. CIWA 0. Continue to titrate down and monitor on CIWA protocol. 08/07: Patient doing well on floor on CIWA protocol. Received ativan overnight and again this AM.  CIWA 3 this AM  8-8: CIWA elevated in AM, but medically stable for discharge back to inpatient rehab. Physical Exam:  Vital Signs: BP 99/61   Pulse 71   Temp 98 °F (36.7 °C) (Temporal)   Resp 18   Ht 5' 8\" (1.727 m)   Wt 123 lb 4.8 oz (55.9 kg)   SpO2 93%   BMI 18.75 kg/m²   General appearance:. Alert and Cooperative   HEENT: Normocephalic. Chest: clear to auscultation bilaterally without wheezes or rhonchi. Cardiac: Normal heart tones with regular rate and rhythm, S1, S2 normal. No murmurs, gallops, or rubs auscultated. Abdomen:soft, non-tender; normal bowel sounds, no masses, no organomegaly. Extremities: No clubbing or cyanosis. No peripheral edema.  Peripheral pulses

## 2018-08-13 NOTE — CONSULTS
Narrative:       CTA PULMONARY W CONTRAST 8/4/2018 5:00 PM  HISTORY: Hypoxia and confusion  COMPARISON: None. DLP: 448 mGy cm. In order to have a CT radiation dose as low as  reasonably achievable, Automated Exposure Control was utilized for  adjustment of the mA and/or KV according to patient size. TECHNIQUE: Helical tomographic images of the chest were obtained after  the administration of intravenous contrast following angiogram  protocol. Additionally, 3D MIP reconstructions in the coronal and  sagittal planes were provided.     FINDINGS:    Pulmonary arteries: There is adequate enhancement of the pulmonary  arteries to evaluate for central and segmental pulmonary emboli. There  are no filling defects within the main, lobar, segmental or visualized  subsegmental pulmonary arteries. The pulmonary vessels are within  normal limits for size. Aorta and great vessels: The aorta is well opacified and demonstrates  no aneurysm, stenosis or dissection. The great vessels are normal in  appearance. No coronary artery calcifications are visualized. Neck base: The imaged portion of the base of the neck appears  unremarkable. Lungs: Centrilobular and paraseptal emphysematous changes are present  in bilateral lungs. These are greater than expected given the  patient's age. Dependent changes are also seen in both lung bases. The  trachea and bronchial tree are patent. Heart: The heart is normal in size. There is no pericardial effusion. Lymph nodes: No pathologically enlarged mediastinal, hilar, or  axillary lymph nodes are present. Bones and soft tissues: The osseous structures of the thorax and  surrounding soft tissues demonstrate no acute process. Upper abdomen: There is evidence of chronic pancreatitis. Diffuse  calcifications are seen throughout the pancreas. Fatty liver disease  is present.      Impression:       1.  No evidence of pulmonary embolus.   2.  Bilateral emphysematous changes are greater

## 2019-03-07 ENCOUNTER — HOSPITAL ENCOUNTER (INPATIENT)
Age: 49
LOS: 7 days | Discharge: HOME | DRG: 897 | End: 2019-03-14
Attending: EMERGENCY MEDICINE | Admitting: HOSPITALIST
Payer: COMMERCIAL

## 2019-03-07 ENCOUNTER — APPOINTMENT (OUTPATIENT)
Dept: GENERAL RADIOLOGY | Age: 49
DRG: 897 | End: 2019-03-07
Payer: COMMERCIAL

## 2019-03-07 DIAGNOSIS — F10.10 ALCOHOL ABUSE: ICD-10-CM

## 2019-03-07 DIAGNOSIS — F10.931 ALCOHOL WITHDRAWAL DELIRIUM (HCC): ICD-10-CM

## 2019-03-07 DIAGNOSIS — F10.920 ACUTE ALCOHOLIC INTOXICATION WITHOUT COMPLICATION (HCC): Primary | ICD-10-CM

## 2019-03-07 PROBLEM — F19.10 SUBSTANCE ABUSE (HCC): Status: ACTIVE | Noted: 2019-03-07

## 2019-03-07 PROBLEM — Z21 HIV (HUMAN IMMUNODEFICIENCY VIRUS INFECTION) (HCC): Status: ACTIVE | Noted: 2019-03-07

## 2019-03-07 PROBLEM — B20 HIV (HUMAN IMMUNODEFICIENCY VIRUS INFECTION) (HCC): Status: ACTIVE | Noted: 2019-03-07

## 2019-03-07 PROBLEM — Z72.0 TOBACCO ABUSE: Status: ACTIVE | Noted: 2019-03-07

## 2019-03-07 LAB
ALBUMIN SERPL-MCNC: 4.1 G/DL (ref 3.5–5.2)
ALP BLD-CCNC: 144 U/L (ref 40–130)
ALT SERPL-CCNC: 27 U/L (ref 5–41)
AMPHETAMINE SCREEN, URINE: NEGATIVE
ANION GAP SERPL CALCULATED.3IONS-SCNC: 13 MMOL/L (ref 7–19)
APTT: 26.3 SEC (ref 26–36.2)
AST SERPL-CCNC: 69 U/L (ref 5–40)
BARBITURATE SCREEN URINE: NEGATIVE
BASOPHILS ABSOLUTE: 0.2 K/UL (ref 0–0.2)
BASOPHILS RELATIVE PERCENT: 1.4 % (ref 0–1)
BENZODIAZEPINE SCREEN, URINE: NEGATIVE
BILIRUB SERPL-MCNC: 0.3 MG/DL (ref 0.2–1.2)
BUN BLDV-MCNC: 11 MG/DL (ref 6–20)
C DIFFICILE TOXIN, EIA: NORMAL
CALCIUM SERPL-MCNC: 8.6 MG/DL (ref 8.6–10)
CANNABINOID SCREEN URINE: NEGATIVE
CHLORIDE BLD-SCNC: 105 MMOL/L (ref 98–111)
CO2: 26 MMOL/L (ref 22–29)
COCAINE METABOLITE SCREEN URINE: NEGATIVE
CREAT SERPL-MCNC: 0.5 MG/DL (ref 0.5–1.2)
EOSINOPHILS ABSOLUTE: 1.3 K/UL (ref 0–0.6)
EOSINOPHILS RELATIVE PERCENT: 10.7 % (ref 0–5)
ETHANOL: 464 MG/DL (ref 0–0.08)
GFR NON-AFRICAN AMERICAN: >60
GLUCOSE BLD-MCNC: 138 MG/DL (ref 74–109)
HCT VFR BLD CALC: 48.4 % (ref 42–52)
HEMOGLOBIN: 16.1 G/DL (ref 14–18)
INR BLD: 1.01 (ref 0.88–1.18)
LYMPHOCYTES ABSOLUTE: 3 K/UL (ref 1.1–4.5)
LYMPHOCYTES RELATIVE PERCENT: 24.3 % (ref 20–40)
Lab: NORMAL
MAGNESIUM: 1.9 MG/DL (ref 1.6–2.6)
MCH RBC QN AUTO: 32.5 PG (ref 27–31)
MCHC RBC AUTO-ENTMCNC: 33.3 G/DL (ref 33–37)
MCV RBC AUTO: 97.6 FL (ref 80–94)
MONOCYTES ABSOLUTE: 1.3 K/UL (ref 0–0.9)
MONOCYTES RELATIVE PERCENT: 10.1 % (ref 0–10)
NEUTROPHILS ABSOLUTE: 6.6 K/UL (ref 1.5–7.5)
NEUTROPHILS RELATIVE PERCENT: 53.1 % (ref 50–65)
OPIATE SCREEN URINE: NEGATIVE
PDW BLD-RTO: 13.4 % (ref 11.5–14.5)
PHOSPHORUS: 3.5 MG/DL (ref 2.5–4.5)
PLATELET # BLD: 172 K/UL (ref 130–400)
PMV BLD AUTO: 8.9 FL (ref 9.4–12.4)
POTASSIUM SERPL-SCNC: 3.7 MMOL/L (ref 3.5–5)
PROTHROMBIN TIME: 12.7 SEC (ref 12–14.6)
RBC # BLD: 4.96 M/UL (ref 4.7–6.1)
SODIUM BLD-SCNC: 144 MMOL/L (ref 136–145)
TOTAL PROTEIN: 9 G/DL (ref 6.6–8.7)
WBC # BLD: 12.4 K/UL (ref 4.8–10.8)

## 2019-03-07 PROCEDURE — G0480 DRUG TEST DEF 1-7 CLASSES: HCPCS

## 2019-03-07 PROCEDURE — 87324 CLOSTRIDIUM AG IA: CPT

## 2019-03-07 PROCEDURE — 6360000002 HC RX W HCPCS: Performed by: EMERGENCY MEDICINE

## 2019-03-07 PROCEDURE — 2580000003 HC RX 258: Performed by: EMERGENCY MEDICINE

## 2019-03-07 PROCEDURE — 6360000002 HC RX W HCPCS: Performed by: INTERNAL MEDICINE

## 2019-03-07 PROCEDURE — 99285 EMERGENCY DEPT VISIT HI MDM: CPT | Performed by: EMERGENCY MEDICINE

## 2019-03-07 PROCEDURE — 84100 ASSAY OF PHOSPHORUS: CPT

## 2019-03-07 PROCEDURE — 85610 PROTHROMBIN TIME: CPT

## 2019-03-07 PROCEDURE — 99285 EMERGENCY DEPT VISIT HI MDM: CPT

## 2019-03-07 PROCEDURE — 85025 COMPLETE CBC W/AUTO DIFF WBC: CPT

## 2019-03-07 PROCEDURE — 80053 COMPREHEN METABOLIC PANEL: CPT

## 2019-03-07 PROCEDURE — 2580000003 HC RX 258: Performed by: INTERNAL MEDICINE

## 2019-03-07 PROCEDURE — 2500000003 HC RX 250 WO HCPCS: Performed by: INTERNAL MEDICINE

## 2019-03-07 PROCEDURE — 83735 ASSAY OF MAGNESIUM: CPT

## 2019-03-07 PROCEDURE — 87449 NOS EACH ORGANISM AG IA: CPT

## 2019-03-07 PROCEDURE — 85730 THROMBOPLASTIN TIME PARTIAL: CPT

## 2019-03-07 PROCEDURE — 80307 DRUG TEST PRSMV CHEM ANLYZR: CPT

## 2019-03-07 PROCEDURE — 96374 THER/PROPH/DIAG INJ IV PUSH: CPT

## 2019-03-07 PROCEDURE — 36415 COLL VENOUS BLD VENIPUNCTURE: CPT

## 2019-03-07 PROCEDURE — 2500000003 HC RX 250 WO HCPCS: Performed by: EMERGENCY MEDICINE

## 2019-03-07 PROCEDURE — 2700000000 HC OXYGEN THERAPY PER DAY

## 2019-03-07 PROCEDURE — 6370000000 HC RX 637 (ALT 250 FOR IP): Performed by: INTERNAL MEDICINE

## 2019-03-07 PROCEDURE — 71045 X-RAY EXAM CHEST 1 VIEW: CPT

## 2019-03-07 PROCEDURE — 2000000000 HC ICU R&B

## 2019-03-07 PROCEDURE — 99223 1ST HOSP IP/OBS HIGH 75: CPT | Performed by: INTERNAL MEDICINE

## 2019-03-07 RX ORDER — SODIUM CHLORIDE 0.9 % (FLUSH) 0.9 %
10 SYRINGE (ML) INJECTION EVERY 12 HOURS SCHEDULED
Status: DISCONTINUED | OUTPATIENT
Start: 2019-03-07 | End: 2019-03-14 | Stop reason: HOSPADM

## 2019-03-07 RX ORDER — LORAZEPAM 2 MG/ML
1 INJECTION INTRAMUSCULAR
Status: DISCONTINUED | OUTPATIENT
Start: 2019-03-07 | End: 2019-03-14 | Stop reason: HOSPADM

## 2019-03-07 RX ORDER — SELENIUM 65.4 UG/ML
200 INJECTION, SOLUTION INTRAVENOUS DAILY
COMMUNITY

## 2019-03-07 RX ORDER — LORAZEPAM 1 MG/1
1 TABLET ORAL
Status: DISCONTINUED | OUTPATIENT
Start: 2019-03-07 | End: 2019-03-07

## 2019-03-07 RX ORDER — CLONIDINE HYDROCHLORIDE 0.1 MG/1
0.1 TABLET ORAL 2 TIMES DAILY
Status: DISCONTINUED | OUTPATIENT
Start: 2019-03-07 | End: 2019-03-09

## 2019-03-07 RX ORDER — LORAZEPAM 1 MG/1
4 TABLET ORAL
Status: DISCONTINUED | OUTPATIENT
Start: 2019-03-07 | End: 2019-03-07

## 2019-03-07 RX ORDER — ONDANSETRON 2 MG/ML
4 INJECTION INTRAMUSCULAR; INTRAVENOUS EVERY 6 HOURS PRN
Status: DISCONTINUED | OUTPATIENT
Start: 2019-03-07 | End: 2019-03-14 | Stop reason: HOSPADM

## 2019-03-07 RX ORDER — SODIUM CHLORIDE 0.9 % (FLUSH) 0.9 %
10 SYRINGE (ML) INJECTION PRN
Status: DISCONTINUED | OUTPATIENT
Start: 2019-03-07 | End: 2019-03-14 | Stop reason: HOSPADM

## 2019-03-07 RX ORDER — LORAZEPAM 2 MG/ML
2 INJECTION INTRAMUSCULAR
Status: DISCONTINUED | OUTPATIENT
Start: 2019-03-07 | End: 2019-03-14 | Stop reason: HOSPADM

## 2019-03-07 RX ORDER — ACETAMINOPHEN 160 MG
2000 TABLET,DISINTEGRATING ORAL DAILY
COMMUNITY

## 2019-03-07 RX ORDER — LORAZEPAM 1 MG/1
2 TABLET ORAL
Status: DISCONTINUED | OUTPATIENT
Start: 2019-03-07 | End: 2019-03-07

## 2019-03-07 RX ORDER — LORAZEPAM 2 MG/ML
1 INJECTION INTRAMUSCULAR ONCE
Status: COMPLETED | OUTPATIENT
Start: 2019-03-07 | End: 2019-03-07

## 2019-03-07 RX ORDER — ESCITALOPRAM OXALATE 10 MG/1
20 TABLET ORAL DAILY
Status: DISCONTINUED | OUTPATIENT
Start: 2019-03-07 | End: 2019-03-14 | Stop reason: HOSPADM

## 2019-03-07 RX ORDER — LORAZEPAM 2 MG/ML
1 INJECTION INTRAMUSCULAR EVERY 6 HOURS
Status: DISCONTINUED | OUTPATIENT
Start: 2019-03-07 | End: 2019-03-13

## 2019-03-07 RX ORDER — NICOTINE 21 MG/24HR
1 PATCH, TRANSDERMAL 24 HOURS TRANSDERMAL DAILY
Status: DISCONTINUED | OUTPATIENT
Start: 2019-03-07 | End: 2019-03-14 | Stop reason: HOSPADM

## 2019-03-07 RX ORDER — DICYCLOMINE HYDROCHLORIDE 10 MG/1
10 CAPSULE ORAL
Status: DISCONTINUED | OUTPATIENT
Start: 2019-03-07 | End: 2019-03-14 | Stop reason: HOSPADM

## 2019-03-07 RX ORDER — LORAZEPAM 2 MG/ML
3 INJECTION INTRAMUSCULAR
Status: DISCONTINUED | OUTPATIENT
Start: 2019-03-07 | End: 2019-03-14 | Stop reason: HOSPADM

## 2019-03-07 RX ORDER — LORAZEPAM 1 MG/1
3 TABLET ORAL
Status: DISCONTINUED | OUTPATIENT
Start: 2019-03-07 | End: 2019-03-07

## 2019-03-07 RX ORDER — LORAZEPAM 2 MG/ML
4 INJECTION INTRAMUSCULAR
Status: DISCONTINUED | OUTPATIENT
Start: 2019-03-07 | End: 2019-03-14 | Stop reason: HOSPADM

## 2019-03-07 RX ADMIN — LORAZEPAM 1 MG: 2 INJECTION INTRAMUSCULAR; INTRAVENOUS at 23:07

## 2019-03-07 RX ADMIN — DICYCLOMINE HYDROCHLORIDE 10 MG: 10 CAPSULE ORAL at 19:42

## 2019-03-07 RX ADMIN — ESCITALOPRAM OXALATE 20 MG: 10 TABLET ORAL at 17:02

## 2019-03-07 RX ADMIN — LORAZEPAM 1 MG: 2 INJECTION INTRAMUSCULAR; INTRAVENOUS at 15:56

## 2019-03-07 RX ADMIN — LORAZEPAM 1 MG: 2 INJECTION INTRAMUSCULAR; INTRAVENOUS at 19:42

## 2019-03-07 RX ADMIN — THIAMINE HYDROCHLORIDE: 100 INJECTION, SOLUTION INTRAMUSCULAR; INTRAVENOUS at 12:22

## 2019-03-07 RX ADMIN — LORAZEPAM 1 MG: 2 INJECTION INTRAMUSCULAR; INTRAVENOUS at 12:17

## 2019-03-07 RX ADMIN — THIAMINE HYDROCHLORIDE: 100 INJECTION, SOLUTION INTRAMUSCULAR; INTRAVENOUS at 17:03

## 2019-03-07 RX ADMIN — DICYCLOMINE HYDROCHLORIDE 10 MG: 10 CAPSULE ORAL at 17:02

## 2019-03-07 RX ADMIN — Medication 10 ML: at 19:43

## 2019-03-07 SDOH — HEALTH STABILITY: MENTAL HEALTH: HOW MANY STANDARD DRINKS CONTAINING ALCOHOL DO YOU HAVE ON A TYPICAL DAY?: 10 OR MORE

## 2019-03-07 ASSESSMENT — ENCOUNTER SYMPTOMS
VOMITING: 0
BACK PAIN: 0
SHORTNESS OF BREATH: 0
RHINORRHEA: 0
NAUSEA: 0
DIARRHEA: 0
ABDOMINAL PAIN: 0
SORE THROAT: 0
COUGH: 0

## 2019-03-07 ASSESSMENT — PAIN DESCRIPTION - LOCATION: LOCATION: FOOT

## 2019-03-07 ASSESSMENT — PAIN SCALES - GENERAL
PAINLEVEL_OUTOF10: 7
PAINLEVEL_OUTOF10: 0
PAINLEVEL_OUTOF10: 0

## 2019-03-07 ASSESSMENT — PAIN DESCRIPTION - ORIENTATION: ORIENTATION: RIGHT;LEFT

## 2019-03-08 LAB
ALBUMIN SERPL-MCNC: 3.5 G/DL (ref 3.5–5.2)
ALP BLD-CCNC: 116 U/L (ref 40–130)
ALT SERPL-CCNC: 34 U/L (ref 5–41)
ANION GAP SERPL CALCULATED.3IONS-SCNC: 14 MMOL/L (ref 7–19)
AST SERPL-CCNC: 178 U/L (ref 5–40)
BASOPHILS ABSOLUTE: 0.1 K/UL (ref 0–0.2)
BASOPHILS RELATIVE PERCENT: 1.7 % (ref 0–1)
BILIRUB SERPL-MCNC: 0.4 MG/DL (ref 0.2–1.2)
BUN BLDV-MCNC: 11 MG/DL (ref 6–20)
CALCIUM SERPL-MCNC: 7.8 MG/DL (ref 8.6–10)
CHLORIDE BLD-SCNC: 103 MMOL/L (ref 98–111)
CO2: 25 MMOL/L (ref 22–29)
CREAT SERPL-MCNC: 0.5 MG/DL (ref 0.5–1.2)
EOSINOPHILS ABSOLUTE: 0.5 K/UL (ref 0–0.6)
EOSINOPHILS RELATIVE PERCENT: 7.9 % (ref 0–5)
GFR NON-AFRICAN AMERICAN: >60
GLUCOSE BLD-MCNC: 147 MG/DL (ref 74–109)
HCT VFR BLD CALC: 41.4 % (ref 42–52)
HEMOGLOBIN: 13.3 G/DL (ref 14–18)
LYMPHOCYTES ABSOLUTE: 1.4 K/UL (ref 1.1–4.5)
LYMPHOCYTES RELATIVE PERCENT: 21 % (ref 20–40)
MCH RBC QN AUTO: 32.3 PG (ref 27–31)
MCHC RBC AUTO-ENTMCNC: 32.1 G/DL (ref 33–37)
MCV RBC AUTO: 100.5 FL (ref 80–94)
MONOCYTES ABSOLUTE: 0.5 K/UL (ref 0–0.9)
MONOCYTES RELATIVE PERCENT: 7.3 % (ref 0–10)
NEUTROPHILS ABSOLUTE: 4.2 K/UL (ref 1.5–7.5)
NEUTROPHILS RELATIVE PERCENT: 61.7 % (ref 50–65)
PDW BLD-RTO: 13.4 % (ref 11.5–14.5)
PLATELET # BLD: 130 K/UL (ref 130–400)
PMV BLD AUTO: 9.4 FL (ref 9.4–12.4)
POTASSIUM SERPL-SCNC: 4 MMOL/L (ref 3.5–5)
RBC # BLD: 4.12 M/UL (ref 4.7–6.1)
SODIUM BLD-SCNC: 142 MMOL/L (ref 136–145)
TOTAL PROTEIN: 6.9 G/DL (ref 6.6–8.7)
WBC # BLD: 6.9 K/UL (ref 4.8–10.8)

## 2019-03-08 PROCEDURE — 36415 COLL VENOUS BLD VENIPUNCTURE: CPT

## 2019-03-08 PROCEDURE — 6360000002 HC RX W HCPCS: Performed by: INTERNAL MEDICINE

## 2019-03-08 PROCEDURE — 6370000000 HC RX 637 (ALT 250 FOR IP): Performed by: INTERNAL MEDICINE

## 2019-03-08 PROCEDURE — 2580000003 HC RX 258: Performed by: INTERNAL MEDICINE

## 2019-03-08 PROCEDURE — 80053 COMPREHEN METABOLIC PANEL: CPT

## 2019-03-08 PROCEDURE — 2700000000 HC OXYGEN THERAPY PER DAY

## 2019-03-08 PROCEDURE — 85025 COMPLETE CBC W/AUTO DIFF WBC: CPT

## 2019-03-08 PROCEDURE — 99232 SBSQ HOSP IP/OBS MODERATE 35: CPT | Performed by: INTERNAL MEDICINE

## 2019-03-08 PROCEDURE — 2000000000 HC ICU R&B

## 2019-03-08 RX ORDER — LANOLIN ALCOHOL/MO/W.PET/CERES
CREAM (GRAM) TOPICAL 2 TIMES DAILY
Status: DISCONTINUED | OUTPATIENT
Start: 2019-03-08 | End: 2019-03-14 | Stop reason: HOSPADM

## 2019-03-08 RX ORDER — CLOTRIMAZOLE 1 %
CREAM (GRAM) TOPICAL 2 TIMES DAILY
Status: DISCONTINUED | OUTPATIENT
Start: 2019-03-08 | End: 2019-03-14 | Stop reason: HOSPADM

## 2019-03-08 RX ORDER — LANOLIN ALCOHOL/MO/W.PET/CERES
CREAM (GRAM) TOPICAL 2 TIMES DAILY
Status: DISCONTINUED | OUTPATIENT
Start: 2019-03-08 | End: 2019-03-08

## 2019-03-08 RX ORDER — SKIN CLEANSER COMB NO.42
CLEANSER (ML) TOPICAL PRN
Status: DISCONTINUED | OUTPATIENT
Start: 2019-03-08 | End: 2019-03-14 | Stop reason: HOSPADM

## 2019-03-08 RX ADMIN — CLONIDINE HYDROCHLORIDE 0.1 MG: 0.1 TABLET ORAL at 21:14

## 2019-03-08 RX ADMIN — DICYCLOMINE HYDROCHLORIDE 10 MG: 10 CAPSULE ORAL at 16:04

## 2019-03-08 RX ADMIN — DICYCLOMINE HYDROCHLORIDE 10 MG: 10 CAPSULE ORAL at 21:14

## 2019-03-08 RX ADMIN — Medication 10 ML: at 21:14

## 2019-03-08 RX ADMIN — LORAZEPAM 1 MG: 2 INJECTION INTRAMUSCULAR; INTRAVENOUS at 16:04

## 2019-03-08 RX ADMIN — CLONIDINE HYDROCHLORIDE 0.1 MG: 0.1 TABLET ORAL at 08:03

## 2019-03-08 RX ADMIN — MUPIROCIN: 20 OINTMENT TOPICAL at 21:14

## 2019-03-08 RX ADMIN — ESCITALOPRAM OXALATE 20 MG: 10 TABLET ORAL at 08:03

## 2019-03-08 RX ADMIN — LORAZEPAM 1 MG: 2 INJECTION INTRAMUSCULAR; INTRAVENOUS at 04:54

## 2019-03-08 RX ADMIN — LORAZEPAM 1 MG: 2 INJECTION INTRAMUSCULAR; INTRAVENOUS at 14:05

## 2019-03-08 RX ADMIN — Medication: at 14:45

## 2019-03-08 RX ADMIN — LORAZEPAM 1 MG: 2 INJECTION INTRAMUSCULAR; INTRAVENOUS at 12:57

## 2019-03-08 RX ADMIN — Medication 10 ML: at 08:04

## 2019-03-08 RX ADMIN — LORAZEPAM 1 MG: 2 INJECTION INTRAMUSCULAR; INTRAVENOUS at 09:00

## 2019-03-08 RX ADMIN — DICYCLOMINE HYDROCHLORIDE 10 MG: 10 CAPSULE ORAL at 11:00

## 2019-03-08 RX ADMIN — Medication: at 21:14

## 2019-03-08 RX ADMIN — MUPIROCIN: 20 OINTMENT TOPICAL at 15:30

## 2019-03-08 RX ADMIN — LORAZEPAM 1 MG: 2 INJECTION INTRAMUSCULAR; INTRAVENOUS at 10:14

## 2019-03-08 RX ADMIN — LORAZEPAM 1 MG: 2 INJECTION INTRAMUSCULAR; INTRAVENOUS at 21:14

## 2019-03-08 RX ADMIN — CLOTRIMAZOLE: 10 CREAM TOPICAL at 21:14

## 2019-03-08 RX ADMIN — CLOTRIMAZOLE: 10 CREAM TOPICAL at 15:30

## 2019-03-08 RX ADMIN — DICYCLOMINE HYDROCHLORIDE 10 MG: 10 CAPSULE ORAL at 07:30

## 2019-03-08 ASSESSMENT — PAIN SCALES - GENERAL
PAINLEVEL_OUTOF10: 0

## 2019-03-09 LAB
ANION GAP SERPL CALCULATED.3IONS-SCNC: 11 MMOL/L (ref 7–19)
BASOPHILS ABSOLUTE: 0.1 K/UL (ref 0–0.2)
BASOPHILS RELATIVE PERCENT: 0.8 % (ref 0–1)
BUN BLDV-MCNC: 10 MG/DL (ref 6–20)
CALCIUM SERPL-MCNC: 8.4 MG/DL (ref 8.6–10)
CHLORIDE BLD-SCNC: 100 MMOL/L (ref 98–111)
CO2: 24 MMOL/L (ref 22–29)
CREAT SERPL-MCNC: 0.5 MG/DL (ref 0.5–1.2)
EOSINOPHILS ABSOLUTE: 0.7 K/UL (ref 0–0.6)
EOSINOPHILS RELATIVE PERCENT: 9.9 % (ref 0–5)
GFR NON-AFRICAN AMERICAN: >60
GLUCOSE BLD-MCNC: 132 MG/DL (ref 74–109)
HCT VFR BLD CALC: 43 % (ref 42–52)
HEMOGLOBIN: 14.3 G/DL (ref 14–18)
LYMPHOCYTES ABSOLUTE: 1.7 K/UL (ref 1.1–4.5)
LYMPHOCYTES RELATIVE PERCENT: 22.9 % (ref 20–40)
MCH RBC QN AUTO: 32.9 PG (ref 27–31)
MCHC RBC AUTO-ENTMCNC: 33.3 G/DL (ref 33–37)
MCV RBC AUTO: 98.9 FL (ref 80–94)
MONOCYTES ABSOLUTE: 0.6 K/UL (ref 0–0.9)
MONOCYTES RELATIVE PERCENT: 7.7 % (ref 0–10)
NEUTROPHILS ABSOLUTE: 4.2 K/UL (ref 1.5–7.5)
NEUTROPHILS RELATIVE PERCENT: 58.4 % (ref 50–65)
PDW BLD-RTO: 12.8 % (ref 11.5–14.5)
PLATELET # BLD: 120 K/UL (ref 130–400)
PMV BLD AUTO: 9.4 FL (ref 9.4–12.4)
POTASSIUM SERPL-SCNC: 3.7 MMOL/L (ref 3.5–5)
RBC # BLD: 4.35 M/UL (ref 4.7–6.1)
SODIUM BLD-SCNC: 135 MMOL/L (ref 136–145)
WBC # BLD: 7.3 K/UL (ref 4.8–10.8)

## 2019-03-09 PROCEDURE — 2580000003 HC RX 258: Performed by: INTERNAL MEDICINE

## 2019-03-09 PROCEDURE — 85025 COMPLETE CBC W/AUTO DIFF WBC: CPT

## 2019-03-09 PROCEDURE — 80048 BASIC METABOLIC PNL TOTAL CA: CPT

## 2019-03-09 PROCEDURE — 6360000002 HC RX W HCPCS: Performed by: INTERNAL MEDICINE

## 2019-03-09 PROCEDURE — 2500000003 HC RX 250 WO HCPCS: Performed by: INTERNAL MEDICINE

## 2019-03-09 PROCEDURE — 36415 COLL VENOUS BLD VENIPUNCTURE: CPT

## 2019-03-09 PROCEDURE — 99233 SBSQ HOSP IP/OBS HIGH 50: CPT | Performed by: INTERNAL MEDICINE

## 2019-03-09 PROCEDURE — 2000000000 HC ICU R&B

## 2019-03-09 PROCEDURE — 6370000000 HC RX 637 (ALT 250 FOR IP): Performed by: INTERNAL MEDICINE

## 2019-03-09 PROCEDURE — 51701 INSERT BLADDER CATHETER: CPT

## 2019-03-09 RX ORDER — HYDRALAZINE HYDROCHLORIDE 20 MG/ML
10 INJECTION INTRAMUSCULAR; INTRAVENOUS
Status: DISCONTINUED | OUTPATIENT
Start: 2019-03-09 | End: 2019-03-14 | Stop reason: HOSPADM

## 2019-03-09 RX ADMIN — CLOTRIMAZOLE: 10 CREAM TOPICAL at 20:12

## 2019-03-09 RX ADMIN — LORAZEPAM 4 MG: 2 INJECTION INTRAMUSCULAR; INTRAVENOUS at 03:37

## 2019-03-09 RX ADMIN — LORAZEPAM 1 MG: 2 INJECTION INTRAMUSCULAR; INTRAVENOUS at 15:48

## 2019-03-09 RX ADMIN — LORAZEPAM 3 MG: 2 INJECTION INTRAMUSCULAR; INTRAVENOUS at 00:26

## 2019-03-09 RX ADMIN — LORAZEPAM 4 MG: 2 INJECTION INTRAMUSCULAR; INTRAVENOUS at 02:31

## 2019-03-09 RX ADMIN — DEXMEDETOMIDINE HYDROCHLORIDE 0.2 MCG/KG/HR: 100 INJECTION, SOLUTION INTRAVENOUS at 05:28

## 2019-03-09 RX ADMIN — LORAZEPAM 2 MG: 2 INJECTION INTRAMUSCULAR; INTRAVENOUS at 07:58

## 2019-03-09 RX ADMIN — DEXMEDETOMIDINE HYDROCHLORIDE 1.2 MCG/KG/HR: 100 INJECTION, SOLUTION INTRAVENOUS at 19:49

## 2019-03-09 RX ADMIN — Medication: at 07:26

## 2019-03-09 RX ADMIN — HYDRALAZINE HYDROCHLORIDE 10 MG: 20 INJECTION INTRAMUSCULAR; INTRAVENOUS at 15:48

## 2019-03-09 RX ADMIN — DICYCLOMINE HYDROCHLORIDE 10 MG: 10 CAPSULE ORAL at 20:08

## 2019-03-09 RX ADMIN — LORAZEPAM 1 MG: 2 INJECTION INTRAMUSCULAR; INTRAVENOUS at 04:40

## 2019-03-09 RX ADMIN — MUPIROCIN: 20 OINTMENT TOPICAL at 20:12

## 2019-03-09 RX ADMIN — THIAMINE HYDROCHLORIDE: 100 INJECTION, SOLUTION INTRAMUSCULAR; INTRAVENOUS at 18:43

## 2019-03-09 RX ADMIN — LORAZEPAM 3 MG: 2 INJECTION INTRAMUSCULAR; INTRAVENOUS at 04:40

## 2019-03-09 RX ADMIN — ESCITALOPRAM OXALATE 20 MG: 10 TABLET ORAL at 07:26

## 2019-03-09 RX ADMIN — Medication 10 ML: at 20:10

## 2019-03-09 RX ADMIN — Medication: at 20:12

## 2019-03-09 RX ADMIN — LORAZEPAM 1 MG: 2 INJECTION INTRAMUSCULAR; INTRAVENOUS at 20:07

## 2019-03-09 RX ADMIN — DEXMEDETOMIDINE HYDROCHLORIDE 1.5 MCG/KG/HR: 100 INJECTION, SOLUTION INTRAVENOUS at 14:04

## 2019-03-09 RX ADMIN — ONDANSETRON 4 MG: 2 INJECTION INTRAMUSCULAR; INTRAVENOUS at 20:07

## 2019-03-09 RX ADMIN — THIAMINE HYDROCHLORIDE: 100 INJECTION, SOLUTION INTRAMUSCULAR; INTRAVENOUS at 08:32

## 2019-03-09 RX ADMIN — DEXMEDETOMIDINE HYDROCHLORIDE 1.9 MCG/KG/HR: 100 INJECTION, SOLUTION INTRAVENOUS at 08:32

## 2019-03-09 RX ADMIN — CLOTRIMAZOLE: 10 CREAM TOPICAL at 07:26

## 2019-03-09 RX ADMIN — LORAZEPAM 2 MG: 2 INJECTION INTRAMUSCULAR; INTRAVENOUS at 14:49

## 2019-03-09 RX ADMIN — LORAZEPAM 4 MG: 2 INJECTION INTRAMUSCULAR; INTRAVENOUS at 01:36

## 2019-03-09 RX ADMIN — MUPIROCIN: 20 OINTMENT TOPICAL at 07:26

## 2019-03-09 RX ADMIN — LORAZEPAM 2 MG: 2 INJECTION INTRAMUSCULAR; INTRAVENOUS at 10:23

## 2019-03-09 RX ADMIN — LORAZEPAM 4 MG: 2 INJECTION INTRAMUSCULAR; INTRAVENOUS at 05:42

## 2019-03-09 RX ADMIN — LORAZEPAM 4 MG: 2 INJECTION INTRAMUSCULAR; INTRAVENOUS at 06:45

## 2019-03-09 ASSESSMENT — PAIN SCALES - GENERAL
PAINLEVEL_OUTOF10: 0
PAINLEVEL_OUTOF10: 0

## 2019-03-10 LAB
ALBUMIN SERPL-MCNC: 3.4 G/DL (ref 3.5–5.2)
ALP BLD-CCNC: 121 U/L (ref 40–130)
ALT SERPL-CCNC: 34 U/L (ref 5–41)
ANION GAP SERPL CALCULATED.3IONS-SCNC: 17 MMOL/L (ref 7–19)
AST SERPL-CCNC: 66 U/L (ref 5–40)
BASOPHILS ABSOLUTE: 0.1 K/UL (ref 0–0.2)
BASOPHILS RELATIVE PERCENT: 1.1 % (ref 0–1)
BILIRUB SERPL-MCNC: 0.7 MG/DL (ref 0.2–1.2)
BUN BLDV-MCNC: 11 MG/DL (ref 6–20)
CALCIUM SERPL-MCNC: 7.8 MG/DL (ref 8.6–10)
CHLORIDE BLD-SCNC: 101 MMOL/L (ref 98–111)
CO2: 18 MMOL/L (ref 22–29)
CREAT SERPL-MCNC: <0.5 MG/DL (ref 0.5–1.2)
EOSINOPHILS ABSOLUTE: 0.7 K/UL (ref 0–0.6)
EOSINOPHILS RELATIVE PERCENT: 10.2 % (ref 0–5)
GFR NON-AFRICAN AMERICAN: >60
GLUCOSE BLD-MCNC: 102 MG/DL (ref 74–109)
HCT VFR BLD CALC: 45.8 % (ref 42–52)
HEMOGLOBIN: 14.9 G/DL (ref 14–18)
LYMPHOCYTES ABSOLUTE: 1.4 K/UL (ref 1.1–4.5)
LYMPHOCYTES RELATIVE PERCENT: 21.9 % (ref 20–40)
MCH RBC QN AUTO: 32.7 PG (ref 27–31)
MCHC RBC AUTO-ENTMCNC: 32.5 G/DL (ref 33–37)
MCV RBC AUTO: 100.4 FL (ref 80–94)
MONOCYTES ABSOLUTE: 0.5 K/UL (ref 0–0.9)
MONOCYTES RELATIVE PERCENT: 8.2 % (ref 0–10)
NEUTROPHILS ABSOLUTE: 3.7 K/UL (ref 1.5–7.5)
NEUTROPHILS RELATIVE PERCENT: 57.8 % (ref 50–65)
PDW BLD-RTO: 12.5 % (ref 11.5–14.5)
PLATELET # BLD: 123 K/UL (ref 130–400)
PMV BLD AUTO: 9.9 FL (ref 9.4–12.4)
POTASSIUM SERPL-SCNC: 3.5 MMOL/L (ref 3.5–5)
RBC # BLD: 4.56 M/UL (ref 4.7–6.1)
SODIUM BLD-SCNC: 136 MMOL/L (ref 136–145)
TOTAL PROTEIN: 7.7 G/DL (ref 6.6–8.7)
WBC # BLD: 6.5 K/UL (ref 4.8–10.8)

## 2019-03-10 PROCEDURE — 2580000003 HC RX 258: Performed by: INTERNAL MEDICINE

## 2019-03-10 PROCEDURE — 2500000003 HC RX 250 WO HCPCS: Performed by: INTERNAL MEDICINE

## 2019-03-10 PROCEDURE — 2000000000 HC ICU R&B

## 2019-03-10 PROCEDURE — 51798 US URINE CAPACITY MEASURE: CPT

## 2019-03-10 PROCEDURE — 99233 SBSQ HOSP IP/OBS HIGH 50: CPT | Performed by: INTERNAL MEDICINE

## 2019-03-10 PROCEDURE — 80053 COMPREHEN METABOLIC PANEL: CPT

## 2019-03-10 PROCEDURE — 6370000000 HC RX 637 (ALT 250 FOR IP): Performed by: INTERNAL MEDICINE

## 2019-03-10 PROCEDURE — 6360000002 HC RX W HCPCS: Performed by: INTERNAL MEDICINE

## 2019-03-10 PROCEDURE — 85025 COMPLETE CBC W/AUTO DIFF WBC: CPT

## 2019-03-10 RX ADMIN — DICYCLOMINE HYDROCHLORIDE 10 MG: 10 CAPSULE ORAL at 11:14

## 2019-03-10 RX ADMIN — ESCITALOPRAM OXALATE 20 MG: 10 TABLET ORAL at 08:38

## 2019-03-10 RX ADMIN — DICYCLOMINE HYDROCHLORIDE 10 MG: 10 CAPSULE ORAL at 16:46

## 2019-03-10 RX ADMIN — THIAMINE HYDROCHLORIDE: 100 INJECTION, SOLUTION INTRAMUSCULAR; INTRAVENOUS at 16:46

## 2019-03-10 RX ADMIN — LORAZEPAM 2 MG: 2 INJECTION INTRAMUSCULAR; INTRAVENOUS at 15:10

## 2019-03-10 RX ADMIN — LORAZEPAM 2 MG: 2 INJECTION INTRAMUSCULAR; INTRAVENOUS at 06:25

## 2019-03-10 RX ADMIN — LORAZEPAM 1 MG: 2 INJECTION INTRAMUSCULAR; INTRAVENOUS at 16:10

## 2019-03-10 RX ADMIN — CLOTRIMAZOLE: 10 CREAM TOPICAL at 10:03

## 2019-03-10 RX ADMIN — Medication: at 10:03

## 2019-03-10 RX ADMIN — Medication: at 20:31

## 2019-03-10 RX ADMIN — LORAZEPAM 2 MG: 2 INJECTION INTRAMUSCULAR; INTRAVENOUS at 23:52

## 2019-03-10 RX ADMIN — DEXMEDETOMIDINE HYDROCHLORIDE 1.6 MCG/KG/HR: 100 INJECTION, SOLUTION INTRAVENOUS at 12:08

## 2019-03-10 RX ADMIN — LORAZEPAM 1 MG: 2 INJECTION INTRAMUSCULAR; INTRAVENOUS at 22:00

## 2019-03-10 RX ADMIN — CLOTRIMAZOLE: 10 CREAM TOPICAL at 20:31

## 2019-03-10 RX ADMIN — Medication 10 ML: at 08:38

## 2019-03-10 RX ADMIN — LORAZEPAM 2 MG: 2 INJECTION INTRAMUSCULAR; INTRAVENOUS at 00:14

## 2019-03-10 RX ADMIN — MUPIROCIN: 20 OINTMENT TOPICAL at 10:03

## 2019-03-10 RX ADMIN — DEXMEDETOMIDINE HYDROCHLORIDE 1.6 MCG/KG/HR: 100 INJECTION, SOLUTION INTRAVENOUS at 20:31

## 2019-03-10 RX ADMIN — LORAZEPAM 2 MG: 2 INJECTION INTRAMUSCULAR; INTRAVENOUS at 23:48

## 2019-03-10 RX ADMIN — DEXMEDETOMIDINE HYDROCHLORIDE 1.4 MCG/KG/HR: 100 INJECTION, SOLUTION INTRAVENOUS at 07:26

## 2019-03-10 RX ADMIN — DEXMEDETOMIDINE HYDROCHLORIDE 1.6 MCG/KG/HR: 100 INJECTION, SOLUTION INTRAVENOUS at 00:53

## 2019-03-10 RX ADMIN — LORAZEPAM 3 MG: 2 INJECTION INTRAMUSCULAR; INTRAVENOUS at 11:14

## 2019-03-10 RX ADMIN — DEXMEDETOMIDINE HYDROCHLORIDE 1.6 MCG/KG/HR: 100 INJECTION, SOLUTION INTRAVENOUS at 16:40

## 2019-03-10 RX ADMIN — MUPIROCIN: 20 OINTMENT TOPICAL at 20:31

## 2019-03-10 RX ADMIN — LORAZEPAM 1 MG: 2 INJECTION INTRAMUSCULAR; INTRAVENOUS at 08:38

## 2019-03-10 RX ADMIN — Medication 10 ML: at 20:33

## 2019-03-10 RX ADMIN — DICYCLOMINE HYDROCHLORIDE 10 MG: 10 CAPSULE ORAL at 20:31

## 2019-03-10 RX ADMIN — LORAZEPAM 3 MG: 2 INJECTION INTRAMUSCULAR; INTRAVENOUS at 10:00

## 2019-03-10 RX ADMIN — LORAZEPAM 1 MG: 2 INJECTION INTRAMUSCULAR; INTRAVENOUS at 05:08

## 2019-03-10 RX ADMIN — DICYCLOMINE HYDROCHLORIDE 10 MG: 10 CAPSULE ORAL at 07:26

## 2019-03-10 ASSESSMENT — PAIN SCALES - GENERAL
PAINLEVEL_OUTOF10: 5
PAINLEVEL_OUTOF10: 0

## 2019-03-11 LAB
ANION GAP SERPL CALCULATED.3IONS-SCNC: 14 MMOL/L (ref 7–19)
BUN BLDV-MCNC: 12 MG/DL (ref 6–20)
CALCIUM SERPL-MCNC: 8.1 MG/DL (ref 8.6–10)
CHLORIDE BLD-SCNC: 101 MMOL/L (ref 98–111)
CO2: 21 MMOL/L (ref 22–29)
CREAT SERPL-MCNC: 0.6 MG/DL (ref 0.5–1.2)
GFR NON-AFRICAN AMERICAN: >60
GLUCOSE BLD-MCNC: 126 MG/DL (ref 74–109)
POTASSIUM SERPL-SCNC: 3.2 MMOL/L (ref 3.5–5)
SODIUM BLD-SCNC: 136 MMOL/L (ref 136–145)

## 2019-03-11 PROCEDURE — 99233 SBSQ HOSP IP/OBS HIGH 50: CPT | Performed by: INTERNAL MEDICINE

## 2019-03-11 PROCEDURE — 6360000002 HC RX W HCPCS: Performed by: INTERNAL MEDICINE

## 2019-03-11 PROCEDURE — 2500000003 HC RX 250 WO HCPCS: Performed by: INTERNAL MEDICINE

## 2019-03-11 PROCEDURE — 2580000003 HC RX 258: Performed by: INTERNAL MEDICINE

## 2019-03-11 PROCEDURE — 6370000000 HC RX 637 (ALT 250 FOR IP): Performed by: INTERNAL MEDICINE

## 2019-03-11 PROCEDURE — 80048 BASIC METABOLIC PNL TOTAL CA: CPT

## 2019-03-11 PROCEDURE — 2000000000 HC ICU R&B

## 2019-03-11 PROCEDURE — 36415 COLL VENOUS BLD VENIPUNCTURE: CPT

## 2019-03-11 PROCEDURE — 86361 T CELL ABSOLUTE COUNT: CPT

## 2019-03-11 RX ORDER — POTASSIUM CHLORIDE 7.45 MG/ML
10 INJECTION INTRAVENOUS
Status: DISPENSED | OUTPATIENT
Start: 2019-03-11 | End: 2019-03-11

## 2019-03-11 RX ORDER — POTASSIUM CHLORIDE 7.45 MG/ML
10 INJECTION INTRAVENOUS
Status: COMPLETED | OUTPATIENT
Start: 2019-03-12 | End: 2019-03-12

## 2019-03-11 RX ORDER — CHLORDIAZEPOXIDE HYDROCHLORIDE 25 MG/1
25 CAPSULE, GELATIN COATED ORAL EVERY 8 HOURS
Status: DISCONTINUED | OUTPATIENT
Start: 2019-03-11 | End: 2019-03-12

## 2019-03-11 RX ADMIN — CHLORDIAZEPOXIDE HYDROCHLORIDE 25 MG: 25 CAPSULE ORAL at 22:47

## 2019-03-11 RX ADMIN — CLOTRIMAZOLE: 10 CREAM TOPICAL at 21:08

## 2019-03-11 RX ADMIN — DICYCLOMINE HYDROCHLORIDE 10 MG: 10 CAPSULE ORAL at 07:34

## 2019-03-11 RX ADMIN — LORAZEPAM 1 MG: 2 INJECTION INTRAMUSCULAR; INTRAVENOUS at 10:01

## 2019-03-11 RX ADMIN — POTASSIUM CHLORIDE 10 MEQ: 7.46 INJECTION, SOLUTION INTRAVENOUS at 17:57

## 2019-03-11 RX ADMIN — DEXMEDETOMIDINE HYDROCHLORIDE 2 MCG/KG/HR: 100 INJECTION, SOLUTION INTRAVENOUS at 14:23

## 2019-03-11 RX ADMIN — CLOTRIMAZOLE: 10 CREAM TOPICAL at 10:00

## 2019-03-11 RX ADMIN — Medication: at 21:08

## 2019-03-11 RX ADMIN — MUPIROCIN: 20 OINTMENT TOPICAL at 10:00

## 2019-03-11 RX ADMIN — Medication 10 ML: at 09:02

## 2019-03-11 RX ADMIN — LORAZEPAM 1 MG: 2 INJECTION INTRAMUSCULAR; INTRAVENOUS at 15:41

## 2019-03-11 RX ADMIN — LORAZEPAM 4 MG: 2 INJECTION INTRAMUSCULAR; INTRAVENOUS at 11:03

## 2019-03-11 RX ADMIN — DEXMEDETOMIDINE HYDROCHLORIDE 1.6 MCG/KG/HR: 100 INJECTION, SOLUTION INTRAVENOUS at 07:30

## 2019-03-11 RX ADMIN — LORAZEPAM 1 MG: 2 INJECTION INTRAMUSCULAR; INTRAVENOUS at 03:44

## 2019-03-11 RX ADMIN — DEXMEDETOMIDINE HYDROCHLORIDE 2 MCG/KG/HR: 100 INJECTION, SOLUTION INTRAVENOUS at 11:17

## 2019-03-11 RX ADMIN — ESCITALOPRAM OXALATE 20 MG: 10 TABLET ORAL at 08:54

## 2019-03-11 RX ADMIN — CHLORDIAZEPOXIDE HYDROCHLORIDE 25 MG: 25 CAPSULE ORAL at 15:28

## 2019-03-11 RX ADMIN — LORAZEPAM 2 MG: 2 INJECTION INTRAMUSCULAR; INTRAVENOUS at 08:59

## 2019-03-11 RX ADMIN — Medication: at 10:00

## 2019-03-11 RX ADMIN — LORAZEPAM 4 MG: 2 INJECTION INTRAMUSCULAR; INTRAVENOUS at 12:06

## 2019-03-11 RX ADMIN — DEXMEDETOMIDINE HYDROCHLORIDE 1.6 MCG/KG/HR: 100 INJECTION, SOLUTION INTRAVENOUS at 19:12

## 2019-03-11 RX ADMIN — DICYCLOMINE HYDROCHLORIDE 10 MG: 10 CAPSULE ORAL at 21:08

## 2019-03-11 RX ADMIN — POTASSIUM CHLORIDE 10 MEQ: 7.46 INJECTION, SOLUTION INTRAVENOUS at 16:52

## 2019-03-11 RX ADMIN — THIAMINE HYDROCHLORIDE: 100 INJECTION, SOLUTION INTRAMUSCULAR; INTRAVENOUS at 16:52

## 2019-03-11 RX ADMIN — DEXMEDETOMIDINE HYDROCHLORIDE 1.6 MCG/KG/HR: 100 INJECTION, SOLUTION INTRAVENOUS at 01:21

## 2019-03-11 RX ADMIN — MUPIROCIN: 20 OINTMENT TOPICAL at 21:08

## 2019-03-11 RX ADMIN — POTASSIUM CHLORIDE 10 MEQ: 7.46 INJECTION, SOLUTION INTRAVENOUS at 19:12

## 2019-03-11 RX ADMIN — LORAZEPAM 1 MG: 2 INJECTION INTRAMUSCULAR; INTRAVENOUS at 22:47

## 2019-03-11 RX ADMIN — DICYCLOMINE HYDROCHLORIDE 10 MG: 10 CAPSULE ORAL at 17:19

## 2019-03-11 ASSESSMENT — PAIN SCALES - GENERAL
PAINLEVEL_OUTOF10: 0

## 2019-03-12 LAB
ABSOLUTE CD 4 HELPER: 670 CELLS/UL (ref 430–1800)
ALBUMIN SERPL-MCNC: 3.6 G/DL (ref 3.5–5.2)
ALP BLD-CCNC: 107 U/L (ref 40–130)
ALT SERPL-CCNC: 37 U/L (ref 5–41)
ANION GAP SERPL CALCULATED.3IONS-SCNC: 11 MMOL/L (ref 7–19)
AST SERPL-CCNC: 77 U/L (ref 5–40)
BASOPHILS ABSOLUTE: 0.1 K/UL (ref 0–0.2)
BASOPHILS RELATIVE PERCENT: 0.8 % (ref 0–1)
BILIRUB SERPL-MCNC: 0.5 MG/DL (ref 0.2–1.2)
BUN BLDV-MCNC: 10 MG/DL (ref 6–20)
CALCIUM SERPL-MCNC: 8.3 MG/DL (ref 8.6–10)
CHLORIDE BLD-SCNC: 100 MMOL/L (ref 98–111)
CO2: 22 MMOL/L (ref 22–29)
CREAT SERPL-MCNC: 0.6 MG/DL (ref 0.5–1.2)
EOSINOPHILS ABSOLUTE: 0.5 K/UL (ref 0–0.6)
EOSINOPHILS RELATIVE PERCENT: 6.2 % (ref 0–5)
GFR NON-AFRICAN AMERICAN: >60
GLUCOSE BLD-MCNC: 108 MG/DL (ref 74–109)
HCT VFR BLD CALC: 43.4 % (ref 42–52)
HEMOGLOBIN: 14.2 G/DL (ref 14–18)
LYMPHOCYTES ABSOLUTE: 1.8 K/UL (ref 1.1–4.5)
LYMPHOCYTES RELATIVE PERCENT: 21.3 % (ref 20–40)
MAGNESIUM: 1.3 MG/DL (ref 1.6–2.6)
MCH RBC QN AUTO: 32.6 PG (ref 27–31)
MCHC RBC AUTO-ENTMCNC: 32.7 G/DL (ref 33–37)
MCV RBC AUTO: 99.8 FL (ref 80–94)
MONOCYTES ABSOLUTE: 0.6 K/UL (ref 0–0.9)
MONOCYTES RELATIVE PERCENT: 6.9 % (ref 0–10)
NEUTROPHILS ABSOLUTE: 5.4 K/UL (ref 1.5–7.5)
NEUTROPHILS RELATIVE PERCENT: 64.1 % (ref 50–65)
PDW BLD-RTO: 12.9 % (ref 11.5–14.5)
PLATELET # BLD: 151 K/UL (ref 130–400)
PMV BLD AUTO: 9.5 FL (ref 9.4–12.4)
POTASSIUM SERPL-SCNC: 4.3 MMOL/L (ref 3.5–5)
RBC # BLD: 4.35 M/UL (ref 4.7–6.1)
SODIUM BLD-SCNC: 133 MMOL/L (ref 136–145)
TOTAL PROTEIN: 7.8 G/DL (ref 6.6–8.7)
WBC # BLD: 8.4 K/UL (ref 4.8–10.8)

## 2019-03-12 PROCEDURE — 85025 COMPLETE CBC W/AUTO DIFF WBC: CPT

## 2019-03-12 PROCEDURE — 6370000000 HC RX 637 (ALT 250 FOR IP): Performed by: INTERNAL MEDICINE

## 2019-03-12 PROCEDURE — 6360000002 HC RX W HCPCS: Performed by: INTERNAL MEDICINE

## 2019-03-12 PROCEDURE — 83735 ASSAY OF MAGNESIUM: CPT

## 2019-03-12 PROCEDURE — 36415 COLL VENOUS BLD VENIPUNCTURE: CPT

## 2019-03-12 PROCEDURE — 99233 SBSQ HOSP IP/OBS HIGH 50: CPT | Performed by: INTERNAL MEDICINE

## 2019-03-12 PROCEDURE — 2580000003 HC RX 258: Performed by: INTERNAL MEDICINE

## 2019-03-12 PROCEDURE — 2500000003 HC RX 250 WO HCPCS: Performed by: INTERNAL MEDICINE

## 2019-03-12 PROCEDURE — 80053 COMPREHEN METABOLIC PANEL: CPT

## 2019-03-12 PROCEDURE — 2000000000 HC ICU R&B

## 2019-03-12 RX ORDER — MAGNESIUM SULFATE IN WATER 40 MG/ML
2 INJECTION, SOLUTION INTRAVENOUS ONCE
Status: COMPLETED | OUTPATIENT
Start: 2019-03-12 | End: 2019-03-12

## 2019-03-12 RX ORDER — CHLORDIAZEPOXIDE HYDROCHLORIDE 25 MG/1
50 CAPSULE, GELATIN COATED ORAL EVERY 8 HOURS
Status: DISCONTINUED | OUTPATIENT
Start: 2019-03-12 | End: 2019-03-13

## 2019-03-12 RX ADMIN — LORAZEPAM 4 MG: 2 INJECTION INTRAMUSCULAR; INTRAVENOUS at 15:25

## 2019-03-12 RX ADMIN — LORAZEPAM 1 MG: 2 INJECTION INTRAMUSCULAR; INTRAVENOUS at 21:42

## 2019-03-12 RX ADMIN — Medication 10 ML: at 21:43

## 2019-03-12 RX ADMIN — LORAZEPAM 4 MG: 2 INJECTION INTRAMUSCULAR; INTRAVENOUS at 10:23

## 2019-03-12 RX ADMIN — LORAZEPAM 1 MG: 2 INJECTION INTRAMUSCULAR; INTRAVENOUS at 08:37

## 2019-03-12 RX ADMIN — Medication 10 ML: at 08:38

## 2019-03-12 RX ADMIN — CLOTRIMAZOLE: 10 CREAM TOPICAL at 21:42

## 2019-03-12 RX ADMIN — CLOTRIMAZOLE: 10 CREAM TOPICAL at 08:37

## 2019-03-12 RX ADMIN — LORAZEPAM 2 MG: 2 INJECTION INTRAMUSCULAR; INTRAVENOUS at 12:42

## 2019-03-12 RX ADMIN — POTASSIUM CHLORIDE 10 MEQ: 7.46 INJECTION, SOLUTION INTRAVENOUS at 01:53

## 2019-03-12 RX ADMIN — DICYCLOMINE HYDROCHLORIDE 10 MG: 10 CAPSULE ORAL at 07:27

## 2019-03-12 RX ADMIN — POTASSIUM CHLORIDE 10 MEQ: 7.46 INJECTION, SOLUTION INTRAVENOUS at 00:32

## 2019-03-12 RX ADMIN — MUPIROCIN: 20 OINTMENT TOPICAL at 21:43

## 2019-03-12 RX ADMIN — THIAMINE HYDROCHLORIDE: 100 INJECTION, SOLUTION INTRAMUSCULAR; INTRAVENOUS at 18:09

## 2019-03-12 RX ADMIN — DEXMEDETOMIDINE HYDROCHLORIDE 1.2 MCG/HR: 100 INJECTION, SOLUTION INTRAVENOUS at 14:24

## 2019-03-12 RX ADMIN — Medication: at 08:37

## 2019-03-12 RX ADMIN — POTASSIUM CHLORIDE 10 MEQ: 7.46 INJECTION, SOLUTION INTRAVENOUS at 00:00

## 2019-03-12 RX ADMIN — MUPIROCIN: 20 OINTMENT TOPICAL at 08:37

## 2019-03-12 RX ADMIN — ESCITALOPRAM OXALATE 20 MG: 10 TABLET ORAL at 08:33

## 2019-03-12 RX ADMIN — DICYCLOMINE HYDROCHLORIDE 10 MG: 10 CAPSULE ORAL at 18:09

## 2019-03-12 RX ADMIN — DEXMEDETOMIDINE HYDROCHLORIDE 1.8 MCG/KG/HR: 100 INJECTION, SOLUTION INTRAVENOUS at 04:22

## 2019-03-12 RX ADMIN — CHLORDIAZEPOXIDE HYDROCHLORIDE 50 MG: 25 CAPSULE ORAL at 15:25

## 2019-03-12 RX ADMIN — DICYCLOMINE HYDROCHLORIDE 10 MG: 10 CAPSULE ORAL at 21:41

## 2019-03-12 RX ADMIN — CHLORDIAZEPOXIDE HYDROCHLORIDE 50 MG: 25 CAPSULE ORAL at 22:38

## 2019-03-12 RX ADMIN — Medication: at 21:43

## 2019-03-12 RX ADMIN — MAGNESIUM SULFATE HEPTAHYDRATE 2 G: 40 INJECTION, SOLUTION INTRAVENOUS at 11:38

## 2019-03-12 RX ADMIN — LORAZEPAM 1 MG: 2 INJECTION INTRAMUSCULAR; INTRAVENOUS at 02:25

## 2019-03-12 RX ADMIN — CHLORDIAZEPOXIDE HYDROCHLORIDE 25 MG: 25 CAPSULE ORAL at 07:27

## 2019-03-12 RX ADMIN — DICYCLOMINE HYDROCHLORIDE 10 MG: 10 CAPSULE ORAL at 10:23

## 2019-03-12 RX ADMIN — DEXMEDETOMIDINE HYDROCHLORIDE 1.6 MCG/KG/HR: 100 INJECTION, SOLUTION INTRAVENOUS at 00:32

## 2019-03-12 ASSESSMENT — PAIN SCALES - GENERAL
PAINLEVEL_OUTOF10: 0
PAINLEVEL_OUTOF10: 0

## 2019-03-13 LAB
ALBUMIN SERPL-MCNC: 3.1 G/DL (ref 3.5–5.2)
ALP BLD-CCNC: 91 U/L (ref 40–130)
ALT SERPL-CCNC: 40 U/L (ref 5–41)
ANION GAP SERPL CALCULATED.3IONS-SCNC: 13 MMOL/L (ref 7–19)
AST SERPL-CCNC: 97 U/L (ref 5–40)
BASOPHILS ABSOLUTE: 0.1 K/UL (ref 0–0.2)
BASOPHILS RELATIVE PERCENT: 0.9 % (ref 0–1)
BILIRUB SERPL-MCNC: 0.4 MG/DL (ref 0.2–1.2)
BUN BLDV-MCNC: 14 MG/DL (ref 6–20)
CALCIUM SERPL-MCNC: 8.2 MG/DL (ref 8.6–10)
CHLORIDE BLD-SCNC: 106 MMOL/L (ref 98–111)
CO2: 14 MMOL/L (ref 22–29)
CREAT SERPL-MCNC: 0.6 MG/DL (ref 0.5–1.2)
EOSINOPHILS ABSOLUTE: 0.4 K/UL (ref 0–0.6)
EOSINOPHILS RELATIVE PERCENT: 4.7 % (ref 0–5)
GFR NON-AFRICAN AMERICAN: >60
GLUCOSE BLD-MCNC: 120 MG/DL (ref 74–109)
HCT VFR BLD CALC: 44.8 % (ref 42–52)
HEMOGLOBIN: 13.8 G/DL (ref 14–18)
LYMPHOCYTES ABSOLUTE: 1.4 K/UL (ref 1.1–4.5)
LYMPHOCYTES RELATIVE PERCENT: 18 % (ref 20–40)
MAGNESIUM: 1.9 MG/DL (ref 1.6–2.6)
MCH RBC QN AUTO: 32.9 PG (ref 27–31)
MCHC RBC AUTO-ENTMCNC: 30.8 G/DL (ref 33–37)
MCV RBC AUTO: 106.7 FL (ref 80–94)
MONOCYTES ABSOLUTE: 0.7 K/UL (ref 0–0.9)
MONOCYTES RELATIVE PERCENT: 8.6 % (ref 0–10)
NEUTROPHILS ABSOLUTE: 5.2 K/UL (ref 1.5–7.5)
NEUTROPHILS RELATIVE PERCENT: 66.8 % (ref 50–65)
PDW BLD-RTO: 13.2 % (ref 11.5–14.5)
PLATELET # BLD: 130 K/UL (ref 130–400)
PMV BLD AUTO: 10.2 FL (ref 9.4–12.4)
POTASSIUM SERPL-SCNC: 4.3 MMOL/L (ref 3.5–5)
RBC # BLD: 4.2 M/UL (ref 4.7–6.1)
SODIUM BLD-SCNC: 133 MMOL/L (ref 136–145)
TOTAL PROTEIN: 7.2 G/DL (ref 6.6–8.7)
WBC # BLD: 7.7 K/UL (ref 4.8–10.8)

## 2019-03-13 PROCEDURE — 99233 SBSQ HOSP IP/OBS HIGH 50: CPT | Performed by: INTERNAL MEDICINE

## 2019-03-13 PROCEDURE — 6360000002 HC RX W HCPCS: Performed by: INTERNAL MEDICINE

## 2019-03-13 PROCEDURE — 1210000000 HC MED SURG R&B

## 2019-03-13 PROCEDURE — 36415 COLL VENOUS BLD VENIPUNCTURE: CPT

## 2019-03-13 PROCEDURE — 85025 COMPLETE CBC W/AUTO DIFF WBC: CPT

## 2019-03-13 PROCEDURE — 2500000003 HC RX 250 WO HCPCS: Performed by: INTERNAL MEDICINE

## 2019-03-13 PROCEDURE — 83735 ASSAY OF MAGNESIUM: CPT

## 2019-03-13 PROCEDURE — 80053 COMPREHEN METABOLIC PANEL: CPT

## 2019-03-13 PROCEDURE — 6370000000 HC RX 637 (ALT 250 FOR IP): Performed by: INTERNAL MEDICINE

## 2019-03-13 PROCEDURE — 2580000003 HC RX 258: Performed by: INTERNAL MEDICINE

## 2019-03-13 RX ORDER — CHLORDIAZEPOXIDE HYDROCHLORIDE 25 MG/1
25 CAPSULE, GELATIN COATED ORAL EVERY 8 HOURS
Status: DISCONTINUED | OUTPATIENT
Start: 2019-03-13 | End: 2019-03-13

## 2019-03-13 RX ORDER — CHLORDIAZEPOXIDE HYDROCHLORIDE 25 MG/1
50 CAPSULE, GELATIN COATED ORAL EVERY 8 HOURS
Status: DISCONTINUED | OUTPATIENT
Start: 2019-03-13 | End: 2019-03-14 | Stop reason: HOSPADM

## 2019-03-13 RX ORDER — CYCLOBENZAPRINE HCL 5 MG
5 TABLET ORAL 3 TIMES DAILY PRN
Status: DISCONTINUED | OUTPATIENT
Start: 2019-03-13 | End: 2019-03-14 | Stop reason: HOSPADM

## 2019-03-13 RX ADMIN — Medication: at 23:46

## 2019-03-13 RX ADMIN — LORAZEPAM 2 MG: 2 INJECTION INTRAMUSCULAR; INTRAVENOUS at 09:42

## 2019-03-13 RX ADMIN — LORAZEPAM 2 MG: 2 INJECTION INTRAMUSCULAR; INTRAVENOUS at 07:51

## 2019-03-13 RX ADMIN — LORAZEPAM 2 MG: 2 INJECTION INTRAMUSCULAR; INTRAVENOUS at 21:41

## 2019-03-13 RX ADMIN — MUPIROCIN: 20 OINTMENT TOPICAL at 08:25

## 2019-03-13 RX ADMIN — LORAZEPAM 2 MG: 2 INJECTION INTRAMUSCULAR; INTRAVENOUS at 10:43

## 2019-03-13 RX ADMIN — Medication 10 ML: at 08:25

## 2019-03-13 RX ADMIN — DICYCLOMINE HYDROCHLORIDE 10 MG: 10 CAPSULE ORAL at 06:26

## 2019-03-13 RX ADMIN — Medication: at 08:25

## 2019-03-13 RX ADMIN — ESCITALOPRAM OXALATE 20 MG: 10 TABLET ORAL at 08:22

## 2019-03-13 RX ADMIN — DICYCLOMINE HYDROCHLORIDE 10 MG: 10 CAPSULE ORAL at 21:40

## 2019-03-13 RX ADMIN — THIAMINE HYDROCHLORIDE: 100 INJECTION, SOLUTION INTRAMUSCULAR; INTRAVENOUS at 17:53

## 2019-03-13 RX ADMIN — LORAZEPAM 2 MG: 2 INJECTION INTRAMUSCULAR; INTRAVENOUS at 06:26

## 2019-03-13 RX ADMIN — CHLORDIAZEPOXIDE HYDROCHLORIDE 50 MG: 25 CAPSULE ORAL at 21:40

## 2019-03-13 RX ADMIN — DICYCLOMINE HYDROCHLORIDE 10 MG: 10 CAPSULE ORAL at 17:53

## 2019-03-13 RX ADMIN — CHLORDIAZEPOXIDE HYDROCHLORIDE 50 MG: 25 CAPSULE ORAL at 06:26

## 2019-03-13 RX ADMIN — LORAZEPAM 1 MG: 2 INJECTION INTRAMUSCULAR; INTRAVENOUS at 04:23

## 2019-03-13 RX ADMIN — CLOTRIMAZOLE: 10 CREAM TOPICAL at 08:25

## 2019-03-13 RX ADMIN — DICYCLOMINE HYDROCHLORIDE 10 MG: 10 CAPSULE ORAL at 10:35

## 2019-03-13 RX ADMIN — CHLORDIAZEPOXIDE HYDROCHLORIDE 50 MG: 25 CAPSULE ORAL at 14:23

## 2019-03-13 ASSESSMENT — PAIN SCALES - GENERAL
PAINLEVEL_OUTOF10: 0

## 2019-03-14 VITALS
TEMPERATURE: 98.7 F | RESPIRATION RATE: 18 BRPM | HEIGHT: 68 IN | SYSTOLIC BLOOD PRESSURE: 115 MMHG | HEART RATE: 80 BPM | BODY MASS INDEX: 19.66 KG/M2 | DIASTOLIC BLOOD PRESSURE: 74 MMHG | WEIGHT: 129.7 LBS | OXYGEN SATURATION: 96 %

## 2019-03-14 LAB
ALBUMIN SERPL-MCNC: 3.6 G/DL (ref 3.5–5.2)
ALP BLD-CCNC: 86 U/L (ref 40–130)
ALT SERPL-CCNC: 41 U/L (ref 5–41)
ANION GAP SERPL CALCULATED.3IONS-SCNC: 10 MMOL/L (ref 7–19)
AST SERPL-CCNC: 92 U/L (ref 5–40)
BASOPHILS ABSOLUTE: 0.1 K/UL (ref 0–0.2)
BASOPHILS RELATIVE PERCENT: 1 % (ref 0–1)
BILIRUB SERPL-MCNC: 0.3 MG/DL (ref 0.2–1.2)
BUN BLDV-MCNC: 10 MG/DL (ref 6–20)
CALCIUM SERPL-MCNC: 8.8 MG/DL (ref 8.6–10)
CHLORIDE BLD-SCNC: 107 MMOL/L (ref 98–111)
CO2: 22 MMOL/L (ref 22–29)
CREAT SERPL-MCNC: 0.6 MG/DL (ref 0.5–1.2)
EOSINOPHILS ABSOLUTE: 0.3 K/UL (ref 0–0.6)
EOSINOPHILS RELATIVE PERCENT: 3.2 % (ref 0–5)
GFR NON-AFRICAN AMERICAN: >60
GLUCOSE BLD-MCNC: 120 MG/DL (ref 74–109)
HCT VFR BLD CALC: 38.8 % (ref 42–52)
HEMOGLOBIN: 12.3 G/DL (ref 14–18)
LYMPHOCYTES ABSOLUTE: 1.6 K/UL (ref 1.1–4.5)
LYMPHOCYTES RELATIVE PERCENT: 17.4 % (ref 20–40)
MAGNESIUM: 1.5 MG/DL (ref 1.6–2.6)
MCH RBC QN AUTO: 32.5 PG (ref 27–31)
MCHC RBC AUTO-ENTMCNC: 31.7 G/DL (ref 33–37)
MCV RBC AUTO: 102.6 FL (ref 80–94)
MONOCYTES ABSOLUTE: 1.1 K/UL (ref 0–0.9)
MONOCYTES RELATIVE PERCENT: 12.1 % (ref 0–10)
NEUTROPHILS ABSOLUTE: 6.2 K/UL (ref 1.5–7.5)
NEUTROPHILS RELATIVE PERCENT: 65.6 % (ref 50–65)
PDW BLD-RTO: 13.4 % (ref 11.5–14.5)
PLATELET # BLD: 170 K/UL (ref 130–400)
PMV BLD AUTO: 9.4 FL (ref 9.4–12.4)
POTASSIUM SERPL-SCNC: 3.3 MMOL/L (ref 3.5–5)
RBC # BLD: 3.78 M/UL (ref 4.7–6.1)
SODIUM BLD-SCNC: 139 MMOL/L (ref 136–145)
TOTAL PROTEIN: 7.3 G/DL (ref 6.6–8.7)
WBC # BLD: 9.4 K/UL (ref 4.8–10.8)

## 2019-03-14 PROCEDURE — 80053 COMPREHEN METABOLIC PANEL: CPT

## 2019-03-14 PROCEDURE — 6370000000 HC RX 637 (ALT 250 FOR IP): Performed by: INTERNAL MEDICINE

## 2019-03-14 PROCEDURE — 2580000003 HC RX 258: Performed by: INTERNAL MEDICINE

## 2019-03-14 PROCEDURE — 87536 HIV-1 QUANT&REVRSE TRNSCRPJ: CPT

## 2019-03-14 PROCEDURE — 85025 COMPLETE CBC W/AUTO DIFF WBC: CPT

## 2019-03-14 PROCEDURE — 83735 ASSAY OF MAGNESIUM: CPT

## 2019-03-14 PROCEDURE — 36415 COLL VENOUS BLD VENIPUNCTURE: CPT

## 2019-03-14 PROCEDURE — 99238 HOSP IP/OBS DSCHRG MGMT 30/<: CPT | Performed by: HOSPITALIST

## 2019-03-14 RX ORDER — CHLORDIAZEPOXIDE HYDROCHLORIDE 25 MG/1
25 CAPSULE, GELATIN COATED ORAL EVERY 8 HOURS
Qty: 9 CAPSULE | Refills: 3 | Status: SHIPPED | OUTPATIENT
Start: 2019-03-14 | End: 2019-03-14

## 2019-03-14 RX ORDER — CHLORDIAZEPOXIDE HYDROCHLORIDE 25 MG/1
25 CAPSULE, GELATIN COATED ORAL EVERY 8 HOURS
Qty: 9 CAPSULE | Refills: 0 | Status: SHIPPED | OUTPATIENT
Start: 2019-03-14 | End: 2019-03-17

## 2019-03-14 RX ADMIN — DICYCLOMINE HYDROCHLORIDE 10 MG: 10 CAPSULE ORAL at 06:46

## 2019-03-14 RX ADMIN — MUPIROCIN: 20 OINTMENT TOPICAL at 00:23

## 2019-03-14 RX ADMIN — CLOTRIMAZOLE: 10 CREAM TOPICAL at 10:30

## 2019-03-14 RX ADMIN — Medication: at 10:53

## 2019-03-14 RX ADMIN — CHLORDIAZEPOXIDE HYDROCHLORIDE 50 MG: 25 CAPSULE ORAL at 06:46

## 2019-03-14 RX ADMIN — CLOTRIMAZOLE: 10 CREAM TOPICAL at 00:23

## 2019-03-14 RX ADMIN — DICYCLOMINE HYDROCHLORIDE 10 MG: 10 CAPSULE ORAL at 10:52

## 2019-03-14 RX ADMIN — Medication 10 ML: at 00:24

## 2019-03-14 RX ADMIN — MUPIROCIN: 20 OINTMENT TOPICAL at 10:52

## 2019-03-14 RX ADMIN — ESCITALOPRAM OXALATE 20 MG: 10 TABLET ORAL at 10:52

## 2019-03-16 LAB
HIV-1 QNT LOG, IU/ML: NOT DETECTED LOG CPY/ML
HIV-1 QNT, IU/ML: NOT DETECTED CPY/ML
INTERPRETATION: NOT DETECTED

## 2020-06-10 NOTE — PROGRESS NOTES
"  Mr. Pepper is 50 y.o. male    CHIEF COMPLAINT: \"I have a swollen left (testicle)\"    HPI  The patient presents with history of left testicular swelling.  Currently this was at its worse 3 weeks ago.  Is associated with a dull aching pain.  That pain is still present but it is much less in intensity and the swelling is improved after course of antibiotics.  He denies any dysuria or frequency.  Patient is positive for HIV but is treated and is had no significant abnormalities.  He denies any fever with these episodes.    The following portions of the patient's history were reviewed and updated as appropriate: allergies, current medications, past family history, past medical history, past social history, past surgical history and problem list.      Review of Systems   Constitutional: Negative for appetite change and fever.   HENT: Negative for hearing loss and sore throat.    Eyes: Negative for pain and redness.   Respiratory: Negative for cough and shortness of breath.    Cardiovascular: Negative for chest pain and leg swelling.   Gastrointestinal: Negative for anal bleeding, nausea and vomiting.   Endocrine: Negative for cold intolerance and heat intolerance.   Genitourinary: Positive for scrotal swelling and testicular pain. Negative for dysuria, flank pain, frequency, hematuria and urgency.   Musculoskeletal: Negative for joint swelling and myalgias.   Skin: Negative for color change and rash.   Allergic/Immunologic: Negative for immunocompromised state.   Neurological: Negative for dizziness and speech difficulty.   Hematological: Negative for adenopathy. Does not bruise/bleed easily.   Psychiatric/Behavioral: Negative for dysphoric mood and suicidal ideas.         Current Outpatient Medications:   •  Multiple Vitamins-Minerals (TAB-A-RACHELL) tablet, Take 1 tablet by mouth., Disp: , Rfl:   •  Cholecalciferol (VITAMIN D3) 50 MCG (2000 UT) capsule, Take 2,000 Units by mouth., Disp: , Rfl:   •  cloNIDine (CATAPRES) 0.1 " "MG tablet, Take 0.1 mg by mouth., Disp: , Rfl:   •  Crpwodf-Divtc-Wotmduwt-TenofAF (GENVOYA) [phone] MG per tablet, Take  by mouth., Disp: , Rfl:   •  Prenatal 28-0.8 MG tablet, Take 1 tablet by mouth Daily., Disp: , Rfl:     Past Medical History:   Diagnosis Date   • Erectile dysfunction    • HIV (human immunodeficiency virus infection) (CMS/[facility])        History reviewed. No pertinent surgical history.    Social History     Socioeconomic History   • Marital status: Single     Spouse name: Not on file   • Number of children: Not on file   • Years of education: Not on file   • Highest education level: Not on file   Tobacco Use   • Smoking status: Current Every Day Smoker     Packs/day: 1.50     Years: 25.00     Pack years: 37.50   • Smokeless tobacco: Never Used   Substance and Sexual Activity   • Alcohol use: Yes   • Sexual activity: Defer       Family History   Problem Relation Age of Onset   • Cancer Sister          Temp 98.6 °F (37 °C)   Ht 172.7 cm (68\")   Wt 60.1 kg (132 lb 6.4 oz)   BMI 20.13 kg/m²       Physical Exam  Constitutional: Well nourished, Well developed; No apparent distress.  His vital signs are reviewed  Psychiatric: Appropriate affect; Alert and oriented  Eyes: Unremarkable  Musculoskeletal: Normal gait and station  GI: Abdomen is soft, non-tender  Respiratory: No distress; Unlabored movement; No accessory musculature needed with symmetric movements  Skin: No pallor or diaphoresis  ; penis is normal.  Left testis is normal.  The left epididymis is prominent and somewhat indurated.  There is a definite grade 3 varicocele present.        Data  Results for orders placed or performed in visit on 06/16/20   POC Urinalysis Dipstick, Multipro   Result Value Ref Range    Color Yellow Yellow, Straw, Dark Yellow, Yari    Clarity, UA Clear Clear    Glucose, UA Negative Negative, 1000 mg/dL (3+) mg/dL    Bilirubin Small (1+) (A) Negative    Ketones, UA Trace (A) Negative    Specific Gravity  " 1.025 1.005 - 1.030    Blood, UA Negative Negative    pH, Urine 6.0 5.0 - 8.0    Protein, POC Negative Negative mg/dL    Urobilinogen, UA Normal Normal    Nitrite, UA Negative Negative    Leukocytes Negative Negative     I reviewed his scrotal ultrasound images.  He has a impressive left-sided varicocele as well as epididymal enlargement.  There is no definitive mass.  There does appear to be somewhat of a septated fluid collection around the testis that appears more impressive than current exam.    Assessment and Plan  Diagnoses and all orders for this visit:    Left epididymitis  -     POC Urinalysis Dipstick, Multipro    Left varicocele    -Thank the patient had a significant left epididymitis which is responded to antibiotic therapy.  He currently has some inflammatory changes which should clear on their own.  His varicoceles essentially asymptomatic and really needs no treatment.      (Please note that portions of this note were completed with a voice recognition program.)  Noam Smith MD  06/17/20  10:16

## 2020-06-16 ENCOUNTER — OFFICE VISIT (OUTPATIENT)
Dept: UROLOGY | Facility: CLINIC | Age: 50
End: 2020-06-16

## 2020-06-16 VITALS — WEIGHT: 132.4 LBS | BODY MASS INDEX: 20.07 KG/M2 | TEMPERATURE: 98.6 F | HEIGHT: 68 IN

## 2020-06-16 DIAGNOSIS — I86.1 LEFT VARICOCELE: ICD-10-CM

## 2020-06-16 DIAGNOSIS — N45.1 LEFT EPIDIDYMITIS: Primary | ICD-10-CM

## 2020-06-16 LAB
BILIRUB BLD-MCNC: ABNORMAL MG/DL
CLARITY, POC: CLEAR
COLOR UR: YELLOW
GLUCOSE UR STRIP-MCNC: NEGATIVE MG/DL
KETONES UR QL: ABNORMAL
LEUKOCYTE EST, POC: NEGATIVE
NITRITE UR-MCNC: NEGATIVE MG/ML
PH UR: 6 [PH] (ref 5–8)
PROT UR STRIP-MCNC: NEGATIVE MG/DL
RBC # UR STRIP: NEGATIVE /UL
SP GR UR: 1.02 (ref 1–1.03)
UROBILINOGEN UR QL: NORMAL

## 2020-06-16 PROCEDURE — 99203 OFFICE O/P NEW LOW 30 MIN: CPT | Performed by: UROLOGY

## 2020-06-16 RX ORDER — ACETAMINOPHEN 160 MG
2000 TABLET,DISINTEGRATING ORAL
COMMUNITY

## 2020-06-16 RX ORDER — SWAB
1 SWAB, NON-MEDICATED MISCELLANEOUS DAILY
COMMUNITY
Start: 2020-04-17

## 2020-06-16 RX ORDER — CLONIDINE HYDROCHLORIDE 0.1 MG/1
0.1 TABLET ORAL
Status: ON HOLD | COMMUNITY
End: 2023-03-21

## 2020-06-16 RX ORDER — MULTIVITAMIN WITH FOLIC ACID 400 MCG
1 TABLET ORAL
COMMUNITY
Start: 2018-08-09

## 2023-02-06 ENCOUNTER — TELEPHONE (OUTPATIENT)
Dept: GASTROENTEROLOGY | Facility: CLINIC | Age: 53
End: 2023-02-06
Payer: MEDICAID

## 2023-02-06 NOTE — TELEPHONE ENCOUNTER
Letter sent via my chart and printed for mail to discuss fast track option for colonoscopy screening    Please verify if any family history of colon polyps or colon cancer present as well as if Silvio Pepper has undergone previous colonoscopy

## 2023-03-07 ENCOUNTER — OFFICE VISIT (OUTPATIENT)
Dept: GASTROENTEROLOGY | Facility: CLINIC | Age: 53
End: 2023-03-07
Payer: MEDICAID

## 2023-03-07 VITALS
HEART RATE: 90 BPM | BODY MASS INDEX: 20.92 KG/M2 | SYSTOLIC BLOOD PRESSURE: 120 MMHG | HEIGHT: 68 IN | TEMPERATURE: 97 F | OXYGEN SATURATION: 99 % | DIASTOLIC BLOOD PRESSURE: 70 MMHG | WEIGHT: 138 LBS

## 2023-03-07 DIAGNOSIS — Z12.11 ENCOUNTER FOR SCREENING FOR MALIGNANT NEOPLASM OF COLON: Primary | ICD-10-CM

## 2023-03-07 PROCEDURE — 1160F RVW MEDS BY RX/DR IN RCRD: CPT | Performed by: NURSE PRACTITIONER

## 2023-03-07 PROCEDURE — S0260 H&P FOR SURGERY: HCPCS | Performed by: NURSE PRACTITIONER

## 2023-03-07 PROCEDURE — 1159F MED LIST DOCD IN RCRD: CPT | Performed by: NURSE PRACTITIONER

## 2023-03-07 RX ORDER — ESCITALOPRAM OXALATE 10 MG/1
10 TABLET ORAL DAILY
COMMUNITY

## 2023-03-07 RX ORDER — ALENDRONATE SODIUM 70 MG/1
70 TABLET ORAL
COMMUNITY

## 2023-03-07 RX ORDER — TAMSULOSIN HYDROCHLORIDE 0.4 MG/1
1 CAPSULE ORAL DAILY
Status: ON HOLD | COMMUNITY
End: 2023-03-21

## 2023-03-07 NOTE — H&P (VIEW-ONLY)
Chief Complaint   Patient presents with   • Colonoscopy     Screening colon       PCP: Amanda Carroll APRN  REFER: Brian Rodriguez PA    Subjective     HPI    Silvio Pepper is a 52 y.o. male who presents to office for preventative maintenance.  There is not  a personal history of colon polyps.  There is not a history of colon cancer.  He does not have complaints of nausea/vomiting, change in bowels, weight loss, no BRBPR, no melena.  There is not a family history of colon cancer in first degree relative.  There is not a family history of colon polyps in first degree relative.  Silvio Pepper last colonoscopy-no previous colonoscopy .  Bowels do move on regular basis.        No results for input(s): GLUCOSE, NA, K, CREATININE, BUN, BCR, ALKPHOS, ALT, AST, BILITOT, ALBUMIN in the last 74134 hours.    No results for input(s): EGFRIFNONA, EGFRIFAFRI, EGFRRESULT in the last 11544 hours.     Past Medical History:   Diagnosis Date   • Erectile dysfunction    • HIV (human immunodeficiency virus infection) (HCC)      History reviewed. No pertinent surgical history.  Outpatient Medications Marked as Taking for the 3/7/23 encounter (Office Visit) with Vadim Patel APRN   Medication Sig Dispense Refill   • alendronate (Fosamax) 70 MG tablet Take 1 tablet by mouth Every 7 (Seven) Days.     • Cholecalciferol (VITAMIN D3) 50 MCG (2000 UT) capsule Take 1 capsule by mouth.     • Wtbrmsh-Agsrr-Uhsgfpok-TenofAF (GENVOYA) [phone] MG per tablet Take  by mouth.     • escitalopram (LEXAPRO) 10 MG tablet Take 1 tablet by mouth Daily.     • Multiple Vitamins-Minerals (TAB-A-RACHELL) tablet Take 1 tablet by mouth.     • Prenatal 28-0.8 MG tablet Take 1 tablet by mouth Daily.       No Known Allergies  Social History     Socioeconomic History   • Marital status: Single   Tobacco Use   • Smoking status: Every Day     Packs/day: 0.25     Years: 25.00     Pack years: 6.25     Types: Cigarettes   • Smokeless tobacco: Never   Vaping Use   •  Vaping Use: Former   Substance and Sexual Activity   • Alcohol use: Not Currently   • Drug use: Never   • Sexual activity: Defer     Review of Systems   Constitutional: Negative for unexpected weight change.   Respiratory: Negative for shortness of breath.    Cardiovascular: Negative for chest pain.   Gastrointestinal: Negative for abdominal pain and anal bleeding.     Objective   Vitals:    03/07/23 1024   BP: 120/70   Pulse: 90   Temp: 97 °F (36.1 °C)   SpO2: 99%     Physical Exam  Constitutional:       Appearance: Normal appearance. He is well-developed.   Eyes:      General: No scleral icterus.  Cardiovascular:      Rate and Rhythm: Regular rhythm.      Heart sounds: Normal heart sounds. No murmur heard.  Pulmonary:      Effort: Pulmonary effort is normal. No accessory muscle usage.      Breath sounds: Normal breath sounds.   Abdominal:      General: Bowel sounds are normal. There is no distension.      Palpations: Abdomen is soft. There is no mass.      Tenderness: There is no abdominal tenderness. There is no guarding or rebound.   Skin:     General: Skin is warm and dry.      Coloration: Skin is not jaundiced.   Neurological:      Mental Status: He is alert.   Psychiatric:         Behavior: Behavior is cooperative.       Imaging Results (Most Recent)     None        Body mass index is 20.98 kg/m².    Assessment & Plan   Diagnoses and all orders for this visit:    1. Encounter for screening for malignant neoplasm of colon (Primary)  -     Case Request; Standing  -     Implement Anesthesia Orders Day of Procedure; Standing  -     Obtain Informed Consent; Standing  -     Case Request      COLONOSCOPY WITH ANESTHESIA (N/A)    Miralax prep     Advised pt to stop use of NSAIDs, Fish Oil, and MV 5 days prior to procedure, per Dr Richardson protocol.  Tylenol based products are ok to take.  Pt verbalized understanding.     All risks, benefits, alternatives, and indications of colonoscopy procedure have been discussed  with the patient. Risks to include perforation of the colon requiring possible surgery or colostomy, risk of bleeding from biopsies or removal of colon tissue, possibility of missing a colon polyp or cancer, or adverse drug reaction.  Benefits to include the diagnosis and management of disease of the colon and rectum. Alternatives to include barium enema, radiographic evaluation, lab testing or no intervention. He verbalizes understanding and agrees.     I have explained having an adequate and complete prep is associated with success of colonoscopy.   I have explained to Silvio Pepper that not having an adequate bowel prep can lead to decreased rate of  adenoma detection, longer procedure times, and higher chance the physician will not be able to successfully complete full colonoscopy (able to intubate cecum).   I have discussed bowel prep options of clenpiq vs Miralax prep .  Silvio Pepper has elected to proceed with Miralax prep .   I have also discussed that there are a variety of prep options however prep that is prescribed is influenced on patient health history, how well bowels move on regular basis, and individualized insurance plan.      Vadim Patel, APRN  03/09/23        There are no Patient Instructions on file for this visit.

## 2023-03-07 NOTE — PROGRESS NOTES
Chief Complaint   Patient presents with   • Colonoscopy     Screening colon       PCP: Amanda Carroll APRN  REFER: Brian Rodriguez PA    Subjective     HPI    Silvio Pepper is a 52 y.o. male who presents to office for preventative maintenance.  There is not  a personal history of colon polyps.  There is not a history of colon cancer.  He does not have complaints of nausea/vomiting, change in bowels, weight loss, no BRBPR, no melena.  There is not a family history of colon cancer in first degree relative.  There is not a family history of colon polyps in first degree relative.  Silvio Pepper last colonoscopy-no previous colonoscopy .  Bowels do move on regular basis.        No results for input(s): GLUCOSE, NA, K, CREATININE, BUN, BCR, ALKPHOS, ALT, AST, BILITOT, ALBUMIN in the last 58669 hours.    No results for input(s): EGFRIFNONA, EGFRIFAFRI, EGFRRESULT in the last 72831 hours.     Past Medical History:   Diagnosis Date   • Erectile dysfunction    • HIV (human immunodeficiency virus infection) (HCC)      History reviewed. No pertinent surgical history.  Outpatient Medications Marked as Taking for the 3/7/23 encounter (Office Visit) with Vadim Patel APRN   Medication Sig Dispense Refill   • alendronate (Fosamax) 70 MG tablet Take 1 tablet by mouth Every 7 (Seven) Days.     • Cholecalciferol (VITAMIN D3) 50 MCG (2000 UT) capsule Take 1 capsule by mouth.     • Slazqvy-Kkwkm-Kxsxtxbs-TenofAF (GENVOYA) [phone] MG per tablet Take  by mouth.     • escitalopram (LEXAPRO) 10 MG tablet Take 1 tablet by mouth Daily.     • Multiple Vitamins-Minerals (TAB-A-RACHELL) tablet Take 1 tablet by mouth.     • Prenatal 28-0.8 MG tablet Take 1 tablet by mouth Daily.       No Known Allergies  Social History     Socioeconomic History   • Marital status: Single   Tobacco Use   • Smoking status: Every Day     Packs/day: 0.25     Years: 25.00     Pack years: 6.25     Types: Cigarettes   • Smokeless tobacco: Never   Vaping Use   •  Vaping Use: Former   Substance and Sexual Activity   • Alcohol use: Not Currently   • Drug use: Never   • Sexual activity: Defer     Review of Systems   Constitutional: Negative for unexpected weight change.   Respiratory: Negative for shortness of breath.    Cardiovascular: Negative for chest pain.   Gastrointestinal: Negative for abdominal pain and anal bleeding.     Objective   Vitals:    03/07/23 1024   BP: 120/70   Pulse: 90   Temp: 97 °F (36.1 °C)   SpO2: 99%     Physical Exam  Constitutional:       Appearance: Normal appearance. He is well-developed.   Eyes:      General: No scleral icterus.  Cardiovascular:      Rate and Rhythm: Regular rhythm.      Heart sounds: Normal heart sounds. No murmur heard.  Pulmonary:      Effort: Pulmonary effort is normal. No accessory muscle usage.      Breath sounds: Normal breath sounds.   Abdominal:      General: Bowel sounds are normal. There is no distension.      Palpations: Abdomen is soft. There is no mass.      Tenderness: There is no abdominal tenderness. There is no guarding or rebound.   Skin:     General: Skin is warm and dry.      Coloration: Skin is not jaundiced.   Neurological:      Mental Status: He is alert.   Psychiatric:         Behavior: Behavior is cooperative.       Imaging Results (Most Recent)     None        Body mass index is 20.98 kg/m².    Assessment & Plan   Diagnoses and all orders for this visit:    1. Encounter for screening for malignant neoplasm of colon (Primary)  -     Case Request; Standing  -     Implement Anesthesia Orders Day of Procedure; Standing  -     Obtain Informed Consent; Standing  -     Case Request      COLONOSCOPY WITH ANESTHESIA (N/A)    Miralax prep     Advised pt to stop use of NSAIDs, Fish Oil, and MV 5 days prior to procedure, per Dr Richardson protocol.  Tylenol based products are ok to take.  Pt verbalized understanding.     All risks, benefits, alternatives, and indications of colonoscopy procedure have been discussed  with the patient. Risks to include perforation of the colon requiring possible surgery or colostomy, risk of bleeding from biopsies or removal of colon tissue, possibility of missing a colon polyp or cancer, or adverse drug reaction.  Benefits to include the diagnosis and management of disease of the colon and rectum. Alternatives to include barium enema, radiographic evaluation, lab testing or no intervention. He verbalizes understanding and agrees.     I have explained having an adequate and complete prep is associated with success of colonoscopy.   I have explained to Silvio Pepper that not having an adequate bowel prep can lead to decreased rate of  adenoma detection, longer procedure times, and higher chance the physician will not be able to successfully complete full colonoscopy (able to intubate cecum).   I have discussed bowel prep options of clenpiq vs Miralax prep .  Silvio Pepper has elected to proceed with Miralax prep .   I have also discussed that there are a variety of prep options however prep that is prescribed is influenced on patient health history, how well bowels move on regular basis, and individualized insurance plan.      Vadim Patel, APRN  03/09/23        There are no Patient Instructions on file for this visit.

## 2023-03-14 PROBLEM — Z12.11 ENCOUNTER FOR SCREENING FOR MALIGNANT NEOPLASM OF COLON: Status: ACTIVE | Noted: 2023-03-14

## 2023-03-21 ENCOUNTER — HOSPITAL ENCOUNTER (OUTPATIENT)
Facility: HOSPITAL | Age: 53
Setting detail: HOSPITAL OUTPATIENT SURGERY
Discharge: HOME | End: 2023-03-21
Attending: INTERNAL MEDICINE | Admitting: INTERNAL MEDICINE
Payer: MEDICAID

## 2023-03-21 ENCOUNTER — ANESTHESIA (OUTPATIENT)
Dept: GASTROENTEROLOGY | Facility: HOSPITAL | Age: 53
End: 2023-03-21
Payer: MEDICAID

## 2023-03-21 ENCOUNTER — ANESTHESIA EVENT (OUTPATIENT)
Dept: GASTROENTEROLOGY | Facility: HOSPITAL | Age: 53
End: 2023-03-21
Payer: MEDICAID

## 2023-03-21 VITALS
OXYGEN SATURATION: 97 % | HEIGHT: 67 IN | DIASTOLIC BLOOD PRESSURE: 63 MMHG | TEMPERATURE: 97.8 F | RESPIRATION RATE: 15 BRPM | HEART RATE: 84 BPM | WEIGHT: 137 LBS | SYSTOLIC BLOOD PRESSURE: 88 MMHG | BODY MASS INDEX: 21.5 KG/M2

## 2023-03-21 DIAGNOSIS — Z12.11 ENCOUNTER FOR SCREENING FOR MALIGNANT NEOPLASM OF COLON: ICD-10-CM

## 2023-03-21 PROCEDURE — 45378 DIAGNOSTIC COLONOSCOPY: CPT | Performed by: INTERNAL MEDICINE

## 2023-03-21 PROCEDURE — 25010000002 PROPOFOL 10 MG/ML EMULSION

## 2023-03-21 RX ORDER — LIDOCAINE HYDROCHLORIDE 20 MG/ML
INJECTION, SOLUTION EPIDURAL; INFILTRATION; INTRACAUDAL; PERINEURAL AS NEEDED
Status: DISCONTINUED | OUTPATIENT
Start: 2023-03-21 | End: 2023-03-21 | Stop reason: SURG

## 2023-03-21 RX ORDER — SODIUM CHLORIDE 0.9 % (FLUSH) 0.9 %
10 SYRINGE (ML) INJECTION AS NEEDED
Status: DISCONTINUED | OUTPATIENT
Start: 2023-03-21 | End: 2023-03-21 | Stop reason: HOSPADM

## 2023-03-21 RX ORDER — LIDOCAINE HYDROCHLORIDE 10 MG/ML
0.5 INJECTION, SOLUTION EPIDURAL; INFILTRATION; INTRACAUDAL; PERINEURAL ONCE AS NEEDED
Status: DISCONTINUED | OUTPATIENT
Start: 2023-03-21 | End: 2023-03-21 | Stop reason: HOSPADM

## 2023-03-21 RX ORDER — PROPOFOL 10 MG/ML
VIAL (ML) INTRAVENOUS AS NEEDED
Status: DISCONTINUED | OUTPATIENT
Start: 2023-03-21 | End: 2023-03-21 | Stop reason: SURG

## 2023-03-21 RX ORDER — SODIUM CHLORIDE 9 MG/ML
500 INJECTION, SOLUTION INTRAVENOUS CONTINUOUS PRN
Status: DISCONTINUED | OUTPATIENT
Start: 2023-03-21 | End: 2023-03-21 | Stop reason: HOSPADM

## 2023-03-21 RX ADMIN — SODIUM CHLORIDE 500 ML: 9 INJECTION, SOLUTION INTRAVENOUS at 10:42

## 2023-03-21 RX ADMIN — LIDOCAINE HYDROCHLORIDE 50 MG: 20 INJECTION, SOLUTION EPIDURAL; INFILTRATION; INTRACAUDAL; PERINEURAL at 10:53

## 2023-03-21 RX ADMIN — PROPOFOL INJECTABLE EMULSION 350 MG: 10 INJECTION, EMULSION INTRAVENOUS at 10:53

## 2023-03-21 NOTE — ANESTHESIA PREPROCEDURE EVALUATION
Anesthesia Evaluation     Patient summary reviewed and Nursing notes reviewed   NPO Solid Status: > 8 hours  NPO Liquid Status: > 8 hours           Airway   Mallampati: II  TM distance: >3 FB  No difficulty expected  Dental - normal exam     Pulmonary    (+) a smoker Current Smoked day of surgery,   Cardiovascular - negative cardio ROS  Exercise tolerance: good (4-7 METS)    ECG reviewed    (-) hypertension, CAD      Neuro/Psych  (-) seizures  GI/Hepatic/Renal/Endo - negative ROS   (-) liver disease    Musculoskeletal     Abdominal    Substance History      OB/GYN          Other                        Anesthesia Plan    ASA 2     MAC     intravenous induction     Anesthetic plan, risks, benefits, and alternatives have been provided, discussed and informed consent has been obtained with: patient.    Plan discussed with CRNA.        CODE STATUS:

## 2023-03-21 NOTE — ANESTHESIA POSTPROCEDURE EVALUATION
Patient: Silvio Pepper    Procedure Summary     Date: 03/21/23 Room / Location: [facility] ENDOSCOPY 4 / [facility]    Anesthesia Start: 1051 Anesthesia Stop: 1106    Procedure: COLONOSCOPY WITH ANESTHESIA Diagnosis:       Encounter for screening for malignant neoplasm of colon      (Encounter for screening for malignant neoplasm of colon [Z12.11])    Surgeons: Jesús Richardson DO Provider: Peter Palacio CRNA    Anesthesia Type: MAC ASA Status: 2          Anesthesia Type: MAC    Vitals  Vitals Value Taken Time   BP     Temp     Pulse 72 03/21/23 1106   Resp     SpO2 97 % 03/21/23 1106   Vitals shown include unvalidated device data.        Post Anesthesia Care and Evaluation    Patient location during evaluation: PHASE II  Patient participation: complete - patient participated  Level of consciousness: awake and alert  Pain score: 0    Airway patency: patent  Anesthetic complications: No anesthetic complications  PONV Status: none  Cardiovascular status: acceptable  Respiratory status: acceptable  Hydration status: acceptable  No anesthesia care post op

## 2023-03-23 ENCOUNTER — TELEPHONE (OUTPATIENT)
Dept: GASTROENTEROLOGY | Facility: CLINIC | Age: 53
End: 2023-03-23
Payer: MEDICAID

## 2024-06-18 NOTE — PROGRESS NOTES
Chief Complaint   Patient presents with    Weight Loss     Weight loss       PCP: Brian Rodriguez PA  REFER: No ref. provider found    Subjective     HPI    Silvio Pepper referred to office for evaluation of weight loss.   Decrease appetite.  No abdominal pain.  No heartburn, no indigestion..  no nausea/vomitting.  No signs GI blood loss.  No changes in bowel habit.  Started Remeron few days ago.   Told his thyroid is overactive.    Denies frequent use of NSAIDs.       COLONOSCOPY (03/21/2023 10:48)           Past Medical History:   Diagnosis Date    Erectile dysfunction     HIV (human immunodeficiency virus infection)        Past Surgical History:   Procedure Laterality Date    COLONOSCOPY N/A 3/21/2023    Procedure: COLONOSCOPY WITH ANESTHESIA;  Surgeon: Jesús Richardson DO;  Location: [facility] ENDOSCOPY;  Service: Gastroenterology;  Laterality: N/A;  preop; screening   postop; diverticulosis   PCP none        Outpatient Medications Marked as Taking for the 6/19/24 encounter (Office Visit) with Vadim Patel APRN   Medication Sig Dispense Refill    alendronate (FOSAMAX) 70 MG tablet Take 1 tablet by mouth Every 7 (Seven) Days.      busPIRone (BUSPAR) 10 MG tablet Take 1 tablet by mouth Every 12 (Twelve) Hours.      Cholecalciferol (VITAMIN D3) 50 MCG (2000 UT) capsule Take 1 capsule by mouth.      Palbizc-Yatqk-Tprgfkdm-TenofAF (GENVOYA) [phone] MG per tablet Take  by mouth.      mirtazapine (REMERON) 15 MG tablet Take 1 tablet by mouth every night at bedtime.      Multiple Vitamins-Minerals (TAB-A-RACHELL) tablet Take 1 tablet by mouth.      Prenatal 28-0.8 MG tablet Take 1 tablet by mouth Daily.      venlafaxine XR (EFFEXOR-XR) 75 MG 24 hr capsule Take 1 capsule by mouth Daily.         No Known Allergies    Social History     Socioeconomic History    Marital status: Single   Tobacco Use    Smoking status: Every Day     Current packs/day: 0.25     Average packs/day: 0.3 packs/day for 25.0 years (6.3 ttl  "pk-yrs)     Types: Cigarettes    Smokeless tobacco: Never   Vaping Use    Vaping status: Every Day    Substances: Nicotine    Devices: Disposable    Passive vaping exposure: Yes   Substance and Sexual Activity    Alcohol use: Not Currently    Drug use: Yes     Types: Marijuana    Sexual activity: Defer       Review of Systems   Constitutional:  Positive for unexpected weight change. Negative for fever.   HENT:  Negative for trouble swallowing.    Respiratory:  Negative for shortness of breath.    Cardiovascular:  Negative for chest pain.   Gastrointestinal:  Negative for abdominal pain and anal bleeding.       Objective     Vitals:    06/19/24 0823   BP: 130/80   Pulse: 94   Temp: 98.2 °F (36.8 °C)   SpO2: 98%   Weight: 55.3 kg (122 lb)   Height: 172.7 cm (68\")     Body mass index is 18.55 kg/m².    Physical Exam  Constitutional:       Appearance: Normal appearance. He is well-developed.      Comments: thin   Eyes:      General: No scleral icterus.  Cardiovascular:      Heart sounds: Normal heart sounds. No murmur heard.  Pulmonary:      Effort: Pulmonary effort is normal.   Abdominal:      General: Bowel sounds are normal. There is no distension.      Palpations: Abdomen is soft.      Tenderness: There is no abdominal tenderness. There is no guarding.   Skin:     General: Skin is warm and dry.      Coloration: Skin is not jaundiced.   Neurological:      Mental Status: He is alert.   Psychiatric:         Behavior: Behavior is cooperative.         Imaging Results (Most Recent)       None            Body mass index is 18.55 kg/m².    Assessment & Plan     Diagnoses and all orders for this visit:    1. Weight loss (Primary)  -     Case Request; Standing  -     Implement Anesthesia Orders Day of Procedure; Standing  -     Obtain Informed Consent; Standing  -     CT Abdomen Pelvis With Contrast; Future  -     Case Request         ESOPHAGOGASTRODUODENOSCOPY WITH ANESTHESIA (N/A)    CT does not have to be before " EGD-Silvio Pepper was provided with number for scheduling so that he may call to schedule test    Advised pt to stop use of NSAIDs, Fish Oil, and MV 5 days prior to procedure, per Dr Richardson protocol.  Tylenol based products are ok to take.  Pt verbalized understanding.    The risks of the endoscopy were discussed in detail.  We discussed the risks of perforation (one out of 6253-9616, riskier with dilation), bleeding (one out of 500), and the rare risks of infection, adverse reaction to anesthesia, respiratory failure, cardiac failure including MI and adverse reaction to medications, etc.  We discussed consequences that could occur if a risk were to develop such as the need for hospitalization, blood transfusion, surgical intervention, medications, pain and disability and death.  Alternatives include not doing anything, or pursuing an UGI series which only offers a diagnosis with potential less accuracy compared to EGD.  The patient verbalizes understanding and agrees to proceed.         Vadim Patel, APRN  06/19/24          There are no Patient Instructions on file for this visit.

## 2024-06-18 NOTE — H&P (VIEW-ONLY)
Chief Complaint   Patient presents with    Weight Loss     Weight loss       PCP: Brian Rodriguez PA  REFER: No ref. provider found    Subjective     HPI    Silvio Pepper referred to office for evaluation of weight loss.   Decrease appetite.  No abdominal pain.  No heartburn, no indigestion..  no nausea/vomitting.  No signs GI blood loss.  No changes in bowel habit.  Started Remeron few days ago.   Told his thyroid is overactive.    Denies frequent use of NSAIDs.       COLONOSCOPY (03/21/2023 10:48)           Past Medical History:   Diagnosis Date    Erectile dysfunction     HIV (human immunodeficiency virus infection)        Past Surgical History:   Procedure Laterality Date    COLONOSCOPY N/A 3/21/2023    Procedure: COLONOSCOPY WITH ANESTHESIA;  Surgeon: Jesús Richardson DO;  Location: [facility] ENDOSCOPY;  Service: Gastroenterology;  Laterality: N/A;  preop; screening   postop; diverticulosis   PCP none        Outpatient Medications Marked as Taking for the 6/19/24 encounter (Office Visit) with Vadim Patel APRN   Medication Sig Dispense Refill    alendronate (FOSAMAX) 70 MG tablet Take 1 tablet by mouth Every 7 (Seven) Days.      busPIRone (BUSPAR) 10 MG tablet Take 1 tablet by mouth Every 12 (Twelve) Hours.      Cholecalciferol (VITAMIN D3) 50 MCG (2000 UT) capsule Take 1 capsule by mouth.      Enwnlfn-Wnijb-Rualkohd-TenofAF (GENVOYA) [phone] MG per tablet Take  by mouth.      mirtazapine (REMERON) 15 MG tablet Take 1 tablet by mouth every night at bedtime.      Multiple Vitamins-Minerals (TAB-A-RACHELL) tablet Take 1 tablet by mouth.      Prenatal 28-0.8 MG tablet Take 1 tablet by mouth Daily.      venlafaxine XR (EFFEXOR-XR) 75 MG 24 hr capsule Take 1 capsule by mouth Daily.         No Known Allergies    Social History     Socioeconomic History    Marital status: Single   Tobacco Use    Smoking status: Every Day     Current packs/day: 0.25     Average packs/day: 0.3 packs/day for 25.0 years (6.3 ttl  "pk-yrs)     Types: Cigarettes    Smokeless tobacco: Never   Vaping Use    Vaping status: Every Day    Substances: Nicotine    Devices: Disposable    Passive vaping exposure: Yes   Substance and Sexual Activity    Alcohol use: Not Currently    Drug use: Yes     Types: Marijuana    Sexual activity: Defer       Review of Systems   Constitutional:  Positive for unexpected weight change. Negative for fever.   HENT:  Negative for trouble swallowing.    Respiratory:  Negative for shortness of breath.    Cardiovascular:  Negative for chest pain.   Gastrointestinal:  Negative for abdominal pain and anal bleeding.       Objective     Vitals:    06/19/24 0823   BP: 130/80   Pulse: 94   Temp: 98.2 °F (36.8 °C)   SpO2: 98%   Weight: 55.3 kg (122 lb)   Height: 172.7 cm (68\")     Body mass index is 18.55 kg/m².    Physical Exam  Constitutional:       Appearance: Normal appearance. He is well-developed.      Comments: thin   Eyes:      General: No scleral icterus.  Cardiovascular:      Heart sounds: Normal heart sounds. No murmur heard.  Pulmonary:      Effort: Pulmonary effort is normal.   Abdominal:      General: Bowel sounds are normal. There is no distension.      Palpations: Abdomen is soft.      Tenderness: There is no abdominal tenderness. There is no guarding.   Skin:     General: Skin is warm and dry.      Coloration: Skin is not jaundiced.   Neurological:      Mental Status: He is alert.   Psychiatric:         Behavior: Behavior is cooperative.         Imaging Results (Most Recent)       None            Body mass index is 18.55 kg/m².    Assessment & Plan     Diagnoses and all orders for this visit:    1. Weight loss (Primary)  -     Case Request; Standing  -     Implement Anesthesia Orders Day of Procedure; Standing  -     Obtain Informed Consent; Standing  -     CT Abdomen Pelvis With Contrast; Future  -     Case Request         ESOPHAGOGASTRODUODENOSCOPY WITH ANESTHESIA (N/A)    CT does not have to be before " EGD-Silvio Pepper was provided with number for scheduling so that he may call to schedule test    Advised pt to stop use of NSAIDs, Fish Oil, and MV 5 days prior to procedure, per Dr Richardson protocol.  Tylenol based products are ok to take.  Pt verbalized understanding.    The risks of the endoscopy were discussed in detail.  We discussed the risks of perforation (one out of 2281-3491, riskier with dilation), bleeding (one out of 500), and the rare risks of infection, adverse reaction to anesthesia, respiratory failure, cardiac failure including MI and adverse reaction to medications, etc.  We discussed consequences that could occur if a risk were to develop such as the need for hospitalization, blood transfusion, surgical intervention, medications, pain and disability and death.  Alternatives include not doing anything, or pursuing an UGI series which only offers a diagnosis with potential less accuracy compared to EGD.  The patient verbalizes understanding and agrees to proceed.         Vadim Patel, APRN  06/19/24          There are no Patient Instructions on file for this visit.

## 2024-06-19 ENCOUNTER — OFFICE VISIT (OUTPATIENT)
Dept: GASTROENTEROLOGY | Facility: CLINIC | Age: 54
End: 2024-06-19
Payer: MEDICAID

## 2024-06-19 VITALS
BODY MASS INDEX: 18.49 KG/M2 | DIASTOLIC BLOOD PRESSURE: 80 MMHG | TEMPERATURE: 98.2 F | HEART RATE: 94 BPM | WEIGHT: 122 LBS | SYSTOLIC BLOOD PRESSURE: 130 MMHG | OXYGEN SATURATION: 98 % | HEIGHT: 68 IN

## 2024-06-19 DIAGNOSIS — R63.4 WEIGHT LOSS: Primary | ICD-10-CM

## 2024-06-19 PROCEDURE — 99214 OFFICE O/P EST MOD 30 MIN: CPT | Performed by: NURSE PRACTITIONER

## 2024-06-19 PROCEDURE — 1160F RVW MEDS BY RX/DR IN RCRD: CPT | Performed by: NURSE PRACTITIONER

## 2024-06-19 PROCEDURE — 1159F MED LIST DOCD IN RCRD: CPT | Performed by: NURSE PRACTITIONER

## 2024-06-19 RX ORDER — MIRTAZAPINE 15 MG/1
15 TABLET, FILM COATED ORAL
COMMUNITY
Start: 2024-06-17

## 2024-06-19 RX ORDER — VENLAFAXINE HYDROCHLORIDE 75 MG/1
1 CAPSULE, EXTENDED RELEASE ORAL DAILY
COMMUNITY
Start: 2024-06-03

## 2024-06-19 RX ORDER — BUSPIRONE HYDROCHLORIDE 10 MG/1
1 TABLET ORAL EVERY 12 HOURS SCHEDULED
COMMUNITY
Start: 2024-06-03

## 2024-06-24 ENCOUNTER — ANESTHESIA EVENT (OUTPATIENT)
Dept: GASTROENTEROLOGY | Facility: HOSPITAL | Age: 54
End: 2024-06-24
Payer: MEDICAID

## 2024-06-24 ENCOUNTER — ANESTHESIA (OUTPATIENT)
Dept: GASTROENTEROLOGY | Facility: HOSPITAL | Age: 54
End: 2024-06-24
Payer: MEDICAID

## 2024-06-24 ENCOUNTER — HOSPITAL ENCOUNTER (OUTPATIENT)
Facility: HOSPITAL | Age: 54
Setting detail: HOSPITAL OUTPATIENT SURGERY
Discharge: HOME | End: 2024-06-24
Attending: INTERNAL MEDICINE | Admitting: INTERNAL MEDICINE
Payer: MEDICAID

## 2024-06-24 VITALS
RESPIRATION RATE: 15 BRPM | OXYGEN SATURATION: 99 % | WEIGHT: 113 LBS | DIASTOLIC BLOOD PRESSURE: 60 MMHG | SYSTOLIC BLOOD PRESSURE: 83 MMHG | BODY MASS INDEX: 17.13 KG/M2 | HEIGHT: 68 IN | TEMPERATURE: 96.9 F | HEART RATE: 83 BPM

## 2024-06-24 DIAGNOSIS — R63.4 WEIGHT LOSS: ICD-10-CM

## 2024-06-24 PROCEDURE — 43239 EGD BIOPSY SINGLE/MULTIPLE: CPT | Performed by: INTERNAL MEDICINE

## 2024-06-24 PROCEDURE — 87081 CULTURE SCREEN ONLY: CPT | Performed by: INTERNAL MEDICINE

## 2024-06-24 PROCEDURE — 88305 TISSUE EXAM BY PATHOLOGIST: CPT | Performed by: INTERNAL MEDICINE

## 2024-06-24 PROCEDURE — 25010000002 PROPOFOL 10 MG/ML EMULSION

## 2024-06-24 PROCEDURE — 25810000003 SODIUM CHLORIDE 0.9 % SOLUTION: Performed by: ANESTHESIOLOGY

## 2024-06-24 RX ORDER — SODIUM CHLORIDE 0.9 % (FLUSH) 0.9 %
10 SYRINGE (ML) INJECTION AS NEEDED
Status: DISCONTINUED | OUTPATIENT
Start: 2024-06-24 | End: 2024-06-24 | Stop reason: HOSPADM

## 2024-06-24 RX ORDER — PROPOFOL 10 MG/ML
VIAL (ML) INTRAVENOUS AS NEEDED
Status: DISCONTINUED | OUTPATIENT
Start: 2024-06-24 | End: 2024-06-24 | Stop reason: SURG

## 2024-06-24 RX ORDER — SODIUM CHLORIDE 9 MG/ML
40 INJECTION, SOLUTION INTRAVENOUS AS NEEDED
Status: CANCELLED | OUTPATIENT
Start: 2024-06-24

## 2024-06-24 RX ORDER — LIDOCAINE HYDROCHLORIDE 10 MG/ML
0.5 INJECTION, SOLUTION EPIDURAL; INFILTRATION; INTRACAUDAL; PERINEURAL ONCE AS NEEDED
Status: DISCONTINUED | OUTPATIENT
Start: 2024-06-24 | End: 2024-06-24 | Stop reason: HOSPADM

## 2024-06-24 RX ORDER — SODIUM CHLORIDE 9 MG/ML
500 INJECTION, SOLUTION INTRAVENOUS CONTINUOUS PRN
Status: DISCONTINUED | OUTPATIENT
Start: 2024-06-24 | End: 2024-06-24 | Stop reason: HOSPADM

## 2024-06-24 RX ORDER — LIDOCAINE HYDROCHLORIDE 20 MG/ML
INJECTION, SOLUTION EPIDURAL; INFILTRATION; INTRACAUDAL; PERINEURAL AS NEEDED
Status: DISCONTINUED | OUTPATIENT
Start: 2024-06-24 | End: 2024-06-24 | Stop reason: SURG

## 2024-06-24 RX ORDER — SODIUM CHLORIDE 0.9 % (FLUSH) 0.9 %
10 SYRINGE (ML) INJECTION AS NEEDED
Status: CANCELLED | OUTPATIENT
Start: 2024-06-24

## 2024-06-24 RX ORDER — SODIUM CHLORIDE 9 MG/ML
100 INJECTION, SOLUTION INTRAVENOUS CONTINUOUS
Status: CANCELLED | OUTPATIENT
Start: 2024-06-24

## 2024-06-24 RX ORDER — SODIUM CHLORIDE 0.9 % (FLUSH) 0.9 %
10 SYRINGE (ML) INJECTION EVERY 12 HOURS SCHEDULED
Status: CANCELLED | OUTPATIENT
Start: 2024-06-24

## 2024-06-24 RX ADMIN — SODIUM CHLORIDE 500 ML: 9 INJECTION, SOLUTION INTRAVENOUS at 11:26

## 2024-06-24 RX ADMIN — LIDOCAINE HYDROCHLORIDE 100 MG: 20 INJECTION, SOLUTION EPIDURAL; INFILTRATION; INTRACAUDAL; PERINEURAL at 11:37

## 2024-06-24 RX ADMIN — PROPOFOL 250 MG: 10 INJECTION, EMULSION INTRAVENOUS at 11:37

## 2024-06-24 NOTE — ANESTHESIA POSTPROCEDURE EVALUATION
Patient: Silvio Pepper    Procedure Summary       Date: 06/24/24 Room / Location: [facility] ENDOSCOPY 4 / [facility] ENDOSCOPY    Anesthesia Start: 1133 Anesthesia Stop: 1145    Procedure: ESOPHAGOGASTRODUODENOSCOPY WITH ANESTHESIA Diagnosis:       Weight loss      (Weight loss [R63.4])    Surgeons: Jesús Richardson DO Provider: Peter Palacio CRNA    Anesthesia Type: MAC ASA Status: 2            Anesthesia Type: MAC    Vitals  No vitals data found for the desired time range.          Post Anesthesia Care and Evaluation    Patient location during evaluation: PHASE II  Patient participation: complete - patient participated  Level of consciousness: awake and alert  Pain score: 0    Airway patency: patent  Anesthetic complications: No anesthetic complications  PONV Status: none  Cardiovascular status: acceptable  Respiratory status: acceptable  Hydration status: acceptable  No anesthesia care post op

## 2024-06-24 NOTE — ANESTHESIA PREPROCEDURE EVALUATION
Anesthesia Evaluation     Patient summary reviewed and Nursing notes reviewed   NPO Solid Status: > 8 hours  NPO Liquid Status: > 8 hours           Airway   Mallampati: II  TM distance: >3 FB  No difficulty expected  Dental - normal exam     Pulmonary    (+) a smoker Current, Smoked day of surgery,  Cardiovascular - negative cardio ROS  Exercise tolerance: good (4-7 METS)    ECG reviewed    (-) hypertension, CAD      Neuro/Psych  (-) seizures  GI/Hepatic/Renal/Endo - negative ROS   (-) liver disease    Musculoskeletal     Abdominal    Substance History      OB/GYN          Other                          Anesthesia Plan    ASA 2     MAC     intravenous induction     Anesthetic plan, risks, benefits, and alternatives have been provided, discussed and informed consent has been obtained with: patient.        CODE STATUS:

## 2024-06-25 LAB
CYTO UR: NORMAL
LAB AP CASE REPORT: NORMAL
Lab: NORMAL
PATH REPORT.FINAL DX SPEC: NORMAL
PATH REPORT.GROSS SPEC: NORMAL
UREASE TISS QL: NEGATIVE

## 2024-06-26 ENCOUNTER — TELEPHONE (OUTPATIENT)
Dept: GASTROENTEROLOGY | Facility: CLINIC | Age: 54
End: 2024-06-26
Payer: MEDICAID

## 2024-06-26 NOTE — TELEPHONE ENCOUNTER
----- Message from Jesús Richardson sent at 6/26/2024  7:06 AM CDT -----  Please let patient know small bowel biopsy was negative     Thank you  ----- Message -----  From: Lab, Background User  Sent: 6/25/2024   1:41 PM CDT  To: Jesús Richardson, DO

## 2024-06-27 ENCOUNTER — HOSPITAL ENCOUNTER (OUTPATIENT)
Dept: CT IMAGING | Facility: HOSPITAL | Age: 54
Discharge: HOME | End: 2024-06-27
Admitting: NURSE PRACTITIONER
Payer: MEDICAID

## 2024-06-27 DIAGNOSIS — R63.4 WEIGHT LOSS: ICD-10-CM

## 2024-06-27 LAB — CREAT BLDA-MCNC: 0.7 MG/DL (ref 0.6–1.3)

## 2024-06-27 PROCEDURE — 74177 CT ABD & PELVIS W/CONTRAST: CPT

## 2024-06-27 PROCEDURE — 82565 ASSAY OF CREATININE: CPT

## 2024-06-27 PROCEDURE — 25510000001 IOPAMIDOL 61 % SOLUTION: Performed by: NURSE PRACTITIONER

## 2024-06-27 RX ADMIN — IOPAMIDOL 100 ML: 612 INJECTION, SOLUTION INTRAVENOUS at 09:09

## 2024-07-01 ENCOUNTER — TELEPHONE (OUTPATIENT)
Dept: GASTROENTEROLOGY | Facility: CLINIC | Age: 54
End: 2024-07-01
Payer: MEDICAID

## 2024-07-01 DIAGNOSIS — R63.4 WEIGHT LOSS: Primary | ICD-10-CM

## 2024-07-01 DIAGNOSIS — R93.89 ABNORMAL CT SCAN: ICD-10-CM

## 2024-07-01 NOTE — TELEPHONE ENCOUNTER
Spoke with Silivo Pepper via phone regarding result of CT scan    He was agreeable to CTE for further evaluation.  Order entered    I asked Silvoi Pepper to notify office if he has not heard from scheduling within one week    Silvio Pepper verbalized understanding

## 2024-07-18 ENCOUNTER — HOSPITAL ENCOUNTER (OUTPATIENT)
Dept: CT IMAGING | Facility: HOSPITAL | Age: 54
Discharge: HOME | End: 2024-07-18
Admitting: NURSE PRACTITIONER
Payer: MEDICAID

## 2024-07-18 DIAGNOSIS — R93.89 ABNORMAL CT SCAN: ICD-10-CM

## 2024-07-18 DIAGNOSIS — R63.4 WEIGHT LOSS: ICD-10-CM

## 2024-07-18 LAB — CREAT BLDA-MCNC: 0.8 MG/DL (ref 0.6–1.3)

## 2024-07-18 PROCEDURE — 74177 CT ABD & PELVIS W/CONTRAST: CPT

## 2024-07-18 PROCEDURE — 25510000001 IOPAMIDOL 61 % SOLUTION: Performed by: NURSE PRACTITIONER

## 2024-07-18 PROCEDURE — 82565 ASSAY OF CREATININE: CPT

## 2024-07-18 RX ADMIN — IOPAMIDOL 100 ML: 612 INJECTION, SOLUTION INTRAVENOUS at 12:19

## 2024-07-18 RX ADMIN — Medication 1500 ML: at 12:29

## 2024-07-18 RX ADMIN — IOPAMIDOL 50 ML: 612 INJECTION, SOLUTION INTRAVENOUS at 12:19

## 2024-07-24 NOTE — PROGRESS NOTES
Subjective    Mr. Pepper is 54 y.o. male    Chief Complaint: Elevated PSA    History of Present Illness  54-year-old male new patient referred by HIV clinic for new problem of elevated PSA of 6.0.  No previous PSA levels for comparison.  This PSA has not been repeated.  He denies bothersome LUTS, hematuria, or dysuria.  No previous urologic history.    PSA 6.0 (6-6-24)    The following portions of the patient's history were reviewed and updated as appropriate: allergies, current medications, past family history, past medical history, past social history, past surgical history and problem list.    Review of Systems      Current Outpatient Medications:     alendronate (FOSAMAX) 70 MG tablet, Take 1 tablet by mouth Every 7 (Seven) Days., Disp: , Rfl:     busPIRone (BUSPAR) 10 MG tablet, Take 1 tablet by mouth Every 12 (Twelve) Hours., Disp: , Rfl:     Cholecalciferol (VITAMIN D3) 50 MCG (2000 UT) capsule, Take 1 capsule by mouth., Disp: , Rfl:     Lgrzeen-Gtaqf-Ymrnbpms-TenofAF (GENVOYA) [phone] MG per tablet, Take  by mouth., Disp: , Rfl:     mirtazapine (REMERON) 15 MG tablet, Take 1 tablet by mouth every night at bedtime., Disp: , Rfl:     Prenatal 28-0.8 MG tablet, Take 1 tablet by mouth Daily., Disp: , Rfl:     venlafaxine XR (EFFEXOR-XR) 75 MG 24 hr capsule, Take 1 capsule by mouth Daily., Disp: , Rfl:     Past Medical History:   Diagnosis Date    Erectile dysfunction     HIV (human immunodeficiency virus infection)        Past Surgical History:   Procedure Laterality Date    COLONOSCOPY N/A 3/21/2023    Procedure: COLONOSCOPY WITH ANESTHESIA;  Surgeon: Jesús Richardson DO;  Location: [facility] ENDOSCOPY;  Service: Gastroenterology;  Laterality: N/A;  preop; screening   postop; diverticulosis   PCP none     ENDOSCOPY N/A 6/24/2024    Procedure: ESOPHAGOGASTRODUODENOSCOPY WITH ANESTHESIA;  Surgeon: Jesús Richardson DO;  Location: [facility] ENDOSCOPY;  Service: Gastroenterology;  Laterality: N/A;  pre weight  loss  post normal  solomon DIANA       Social History     Socioeconomic History    Marital status: Single   Tobacco Use    Smoking status: Every Day     Current packs/day: 0.25     Average packs/day: 0.3 packs/day for 25.0 years (6.3 ttl pk-yrs)     Types: Cigarettes    Smokeless tobacco: Never   Vaping Use    Vaping status: Every Day    Substances: Nicotine    Devices: Disposable    Passive vaping exposure: Yes   Substance and Sexual Activity    Alcohol use: Not Currently    Drug use: Yes     Types: Marijuana    Sexual activity: Defer       Family History   Problem Relation Age of Onset    No Known Problems Mother     No Known Problems Father     Cancer Sister     Colon cancer Neg Hx     Colon polyps Neg Hx     Esophageal cancer Neg Hx        Objective    There were no vitals taken for this visit.    Physical Exam        Results for orders placed or performed during the hospital encounter of 07/18/24   POC Creatinine    Specimen: Blood   Result Value Ref Range    Creatinine 0.80 0.60 - 1.30 mg/dL     Assessment and Plan    Diagnoses and all orders for this visit:    1. Elevated prostate specific antigen (PSA) (Primary)  -     POC Urinalysis Dipstick, Multipro  -     PSA DIAGNOSTIC; Future    \    New problem of elevated PSA with uncertain prognosis.  We had a long discussion regarding the natural history of elevated PSA along with potential causes including possible prostate cancer.  We discussed options for further evaluation with prostate MRI, repeating PSA level to confirm the elevation, prostate biopsy.  He would like to get his PSA redrawn today.  We will contact him with these results.  If his PSA remains elevated for his age greater than 2.5, would recommend prostate MRI for further evaluation.  He voiced understanding and agreement.      This document has been signed by HAIEL Campos MD on July 31, 2024 12:14 CDT

## 2024-07-31 ENCOUNTER — OFFICE VISIT (OUTPATIENT)
Dept: UROLOGY | Facility: CLINIC | Age: 54
End: 2024-07-31
Payer: MEDICAID

## 2024-07-31 ENCOUNTER — LAB (OUTPATIENT)
Dept: LAB | Facility: HOSPITAL | Age: 54
End: 2024-07-31
Payer: MEDICAID

## 2024-07-31 ENCOUNTER — TELEPHONE (OUTPATIENT)
Dept: GASTROENTEROLOGY | Facility: CLINIC | Age: 54
End: 2024-07-31
Payer: MEDICAID

## 2024-07-31 VITALS — BODY MASS INDEX: 17.82 KG/M2 | TEMPERATURE: 97.7 F | HEIGHT: 68 IN | WEIGHT: 117.6 LBS

## 2024-07-31 DIAGNOSIS — R93.5 ABNORMAL CT OF THE ABDOMEN: ICD-10-CM

## 2024-07-31 DIAGNOSIS — R97.20 ELEVATED PROSTATE SPECIFIC ANTIGEN (PSA): Primary | ICD-10-CM

## 2024-07-31 DIAGNOSIS — R63.4 WEIGHT LOSS: Primary | ICD-10-CM

## 2024-07-31 DIAGNOSIS — R93.89 ABNORMAL CT SCAN: ICD-10-CM

## 2024-07-31 DIAGNOSIS — R97.20 ELEVATED PROSTATE SPECIFIC ANTIGEN (PSA): ICD-10-CM

## 2024-07-31 PROBLEM — Z12.11 ENCOUNTER FOR SCREENING FOR MALIGNANT NEOPLASM OF COLON: Status: RESOLVED | Noted: 2023-03-14 | Resolved: 2024-07-31

## 2024-07-31 LAB
BILIRUB BLD-MCNC: NEGATIVE MG/DL
CLARITY, POC: CLEAR
COLOR UR: YELLOW
GLUCOSE UR STRIP-MCNC: NEGATIVE MG/DL
KETONES UR QL: NEGATIVE
LEUKOCYTE EST, POC: ABNORMAL
NITRITE UR-MCNC: NEGATIVE MG/ML
PH UR: 6 [PH] (ref 5–8)
PROT UR STRIP-MCNC: ABNORMAL MG/DL
PSA SERPL-MCNC: 8.02 NG/ML (ref 0–4)
RBC # UR STRIP: ABNORMAL /UL
SP GR UR: 1.03 (ref 1–1.03)
UROBILINOGEN UR QL: NORMAL

## 2024-07-31 PROCEDURE — 36415 COLL VENOUS BLD VENIPUNCTURE: CPT

## 2024-07-31 PROCEDURE — 84153 ASSAY OF PSA TOTAL: CPT

## 2024-07-31 RX ORDER — SILDENAFIL 25 MG/1
TABLET, FILM COATED ORAL
COMMUNITY
Start: 2024-06-30

## 2024-07-31 NOTE — LETTER
July 31, 2024     LEBRON Weathers  [address] [address]    Patient: Silvio Pepper   YOB: 1970   Date of Visit: 7/31/2024     Dear Brian,    Thank you for referring Silvio Pepper to me for evaluation. Below are the relevant portions of my assessment and plan of care.    If you have questions, please do not hesitate to call me. I look forward to following Silvio along with you.         Sincerely,        Ace Campos MD        CC: No Recipients      Progress Notes:  Subjective    Mr. Pepper is 54 y.o. male    Chief Complaint: Elevated PSA    History of Present Illness  54-year-old male new patient referred by HIV clinic for new problem of elevated PSA of 6.0.  No previous PSA levels for comparison.  This PSA has not been repeated.  He denies bothersome LUTS, hematuria, or dysuria.  No previous urologic history.    PSA 6.0 (6-6-24)    The following portions of the patient's history were reviewed and updated as appropriate: allergies, current medications, past family history, past medical history, past social history, past surgical history and problem list.    Review of Systems      Current Outpatient Medications:   •  alendronate (FOSAMAX) 70 MG tablet, Take 1 tablet by mouth Every 7 (Seven) Days., Disp: , Rfl:   •  busPIRone (BUSPAR) 10 MG tablet, Take 1 tablet by mouth Every 12 (Twelve) Hours., Disp: , Rfl:   •  Cholecalciferol (VITAMIN D3) 50 MCG (2000 UT) capsule, Take 1 capsule by mouth., Disp: , Rfl:   •  Odpomwj-Uwodr-Xhejdmoj-TenofAF (GENVOYA) [phone] MG per tablet, Take  by mouth., Disp: , Rfl:   •  mirtazapine (REMERON) 15 MG tablet, Take 1 tablet by mouth every night at bedtime., Disp: , Rfl:   •  Prenatal 28-0.8 MG tablet, Take 1 tablet by mouth Daily., Disp: , Rfl:   •  venlafaxine XR (EFFEXOR-XR) 75 MG 24 hr capsule, Take 1 capsule by mouth Daily., Disp: , Rfl:     Past Medical History:   Diagnosis Date   • Erectile dysfunction    • HIV (human immunodeficiency virus  infection)        Past Surgical History:   Procedure Laterality Date   • COLONOSCOPY N/A 3/21/2023    Procedure: COLONOSCOPY WITH ANESTHESIA;  Surgeon: Jesús Richardson DO;  Location: [facility] ENDOSCOPY;  Service: Gastroenterology;  Laterality: N/A;  preop; screening   postop; diverticulosis   PCP none    • ENDOSCOPY N/A 6/24/2024    Procedure: ESOPHAGOGASTRODUODENOSCOPY WITH ANESTHESIA;  Surgeon: Jesús Richardson DO;  Location: [facility] ENDOSCOPY;  Service: Gastroenterology;  Laterality: N/A;  pre weight loss  post normal  solomon elise DIANA       Social History     Socioeconomic History   • Marital status: Single   Tobacco Use   • Smoking status: Every Day     Current packs/day: 0.25     Average packs/day: 0.3 packs/day for 25.0 years (6.3 ttl pk-yrs)     Types: Cigarettes   • Smokeless tobacco: Never   Vaping Use   • Vaping status: Every Day   • Substances: Nicotine   • Devices: Disposable   • Passive vaping exposure: Yes   Substance and Sexual Activity   • Alcohol use: Not Currently   • Drug use: Yes     Types: Marijuana   • Sexual activity: Defer       Family History   Problem Relation Age of Onset   • No Known Problems Mother    • No Known Problems Father    • Cancer Sister    • Colon cancer Neg Hx    • Colon polyps Neg Hx    • Esophageal cancer Neg Hx        Objective    There were no vitals taken for this visit.    Physical Exam        Results for orders placed or performed during the hospital encounter of 07/18/24   POC Creatinine    Specimen: Blood   Result Value Ref Range    Creatinine 0.80 0.60 - 1.30 mg/dL     Assessment and Plan    Diagnoses and all orders for this visit:    1. Elevated prostate specific antigen (PSA) (Primary)  -     POC Urinalysis Dipstick, Multipro  -     PSA DIAGNOSTIC; Future    \    New problem of elevated PSA with uncertain prognosis.  We had a long discussion regarding the natural history of elevated PSA along with potential causes including possible prostate cancer.  We discussed  options for further evaluation with prostate MRI, repeating PSA level to confirm the elevation, prostate biopsy.  He would like to get his PSA redrawn today.  We will contact him with these results.  If his PSA remains elevated for his age greater than 2.5, would recommend prostate MRI for further evaluation.  He voiced understanding and agreement.      This document has been signed by HAILE Campos MD on July 31, 2024 12:14 CDT

## 2024-07-31 NOTE — TELEPHONE ENCOUNTER
Dr. Richardson spoke with patient - went over CTE results. Needs colonoscopy.  Scheduled Colonoscopy for 08/14/2024 at 7am   Need case request

## 2024-07-31 NOTE — TELEPHONE ENCOUNTER
Case Request entered  Prep sent to pharmacy-miralax prep if he does not want to do Miralax prep we can send in [facility].  It did not look like insurance covered anything else.

## 2024-08-01 DIAGNOSIS — R97.20 ELEVATED PROSTATE SPECIFIC ANTIGEN (PSA): Primary | ICD-10-CM

## 2024-08-01 NOTE — PROGRESS NOTES
I called and informed the patient that his PSA level is confirmed to be elevated 8.0 for which I recommended getting a prostate MRI for further evaluation.  I will ask my office to call and get him scheduled for prostate MRI and also follow-up with me in [place] to discuss his MRI results.  He voiced understanding and agreement.

## 2024-08-08 ENCOUNTER — TELEPHONE (OUTPATIENT)
Dept: UROLOGY | Facility: CLINIC | Age: 54
End: 2024-08-08
Payer: MEDICAID

## 2024-08-08 NOTE — TELEPHONE ENCOUNTER
Called patient and left a voicemail to let him know that we have scheduled him for a follow-up with Dr. Campos to discuss his MRI for 9/16/24 at 11:00am at the [facility].  I asked that he call the office back if this time/date doesn't work for him.    Hub is okay to give this information to the patient if he calls back, or reschedule the appointment if needed.

## 2024-08-14 ENCOUNTER — ANESTHESIA (OUTPATIENT)
Dept: GASTROENTEROLOGY | Facility: HOSPITAL | Age: 54
End: 2024-08-14
Payer: MEDICAID

## 2024-08-14 ENCOUNTER — ANESTHESIA EVENT (OUTPATIENT)
Dept: GASTROENTEROLOGY | Facility: HOSPITAL | Age: 54
End: 2024-08-14
Payer: MEDICAID

## 2024-08-14 ENCOUNTER — HOSPITAL ENCOUNTER (OUTPATIENT)
Facility: HOSPITAL | Age: 54
Setting detail: HOSPITAL OUTPATIENT SURGERY
Discharge: HOME | End: 2024-08-14
Attending: INTERNAL MEDICINE | Admitting: INTERNAL MEDICINE
Payer: MEDICAID

## 2024-08-14 ENCOUNTER — TELEPHONE (OUTPATIENT)
Dept: GASTROENTEROLOGY | Facility: CLINIC | Age: 54
End: 2024-08-14
Payer: MEDICAID

## 2024-08-14 VITALS
TEMPERATURE: 97.1 F | WEIGHT: 120 LBS | OXYGEN SATURATION: 100 % | HEIGHT: 68 IN | SYSTOLIC BLOOD PRESSURE: 87 MMHG | HEART RATE: 78 BPM | RESPIRATION RATE: 18 BRPM | DIASTOLIC BLOOD PRESSURE: 67 MMHG | BODY MASS INDEX: 18.19 KG/M2

## 2024-08-14 DIAGNOSIS — R63.4 WEIGHT LOSS: ICD-10-CM

## 2024-08-14 PROCEDURE — 45385 COLONOSCOPY W/LESION REMOVAL: CPT | Performed by: INTERNAL MEDICINE

## 2024-08-14 PROCEDURE — 25010000002 PROPOFOL 10 MG/ML EMULSION: Performed by: NURSE ANESTHETIST, CERTIFIED REGISTERED

## 2024-08-14 PROCEDURE — 25810000003 SODIUM CHLORIDE 0.9 % SOLUTION: Performed by: NURSE ANESTHETIST, CERTIFIED REGISTERED

## 2024-08-14 RX ORDER — SODIUM CHLORIDE 0.9 % (FLUSH) 0.9 %
10 SYRINGE (ML) INJECTION AS NEEDED
Status: DISCONTINUED | OUTPATIENT
Start: 2024-08-14 | End: 2024-08-14 | Stop reason: HOSPADM

## 2024-08-14 RX ORDER — SODIUM CHLORIDE 9 MG/ML
500 INJECTION, SOLUTION INTRAVENOUS CONTINUOUS PRN
Status: DISCONTINUED | OUTPATIENT
Start: 2024-08-14 | End: 2024-08-14 | Stop reason: HOSPADM

## 2024-08-14 RX ORDER — PROPOFOL 10 MG/ML
VIAL (ML) INTRAVENOUS AS NEEDED
Status: DISCONTINUED | OUTPATIENT
Start: 2024-08-14 | End: 2024-08-14 | Stop reason: SURG

## 2024-08-14 RX ORDER — LIDOCAINE HYDROCHLORIDE 10 MG/ML
0.5 INJECTION, SOLUTION EPIDURAL; INFILTRATION; INTRACAUDAL; PERINEURAL ONCE AS NEEDED
Status: DISCONTINUED | OUTPATIENT
Start: 2024-08-14 | End: 2024-08-14 | Stop reason: HOSPADM

## 2024-08-14 RX ORDER — LIDOCAINE HYDROCHLORIDE 20 MG/ML
INJECTION, SOLUTION EPIDURAL; INFILTRATION; INTRACAUDAL; PERINEURAL AS NEEDED
Status: DISCONTINUED | OUTPATIENT
Start: 2024-08-14 | End: 2024-08-14 | Stop reason: SURG

## 2024-08-14 RX ADMIN — LIDOCAINE HYDROCHLORIDE 100 MG: 20 INJECTION, SOLUTION EPIDURAL; INFILTRATION; INTRACAUDAL; PERINEURAL at 08:50

## 2024-08-14 RX ADMIN — PROPOFOL 70 MG: 10 INJECTION, EMULSION INTRAVENOUS at 09:00

## 2024-08-14 RX ADMIN — PROPOFOL 100 MG: 10 INJECTION, EMULSION INTRAVENOUS at 08:50

## 2024-08-14 RX ADMIN — PROPOFOL 80 MG: 10 INJECTION, EMULSION INTRAVENOUS at 08:57

## 2024-08-14 RX ADMIN — PROPOFOL 50 MG: 10 INJECTION, EMULSION INTRAVENOUS at 08:55

## 2024-08-14 RX ADMIN — SODIUM CHLORIDE 500 ML: 9 INJECTION, SOLUTION INTRAVENOUS at 08:02

## 2024-08-14 RX ADMIN — PROPOFOL 50 MG: 10 INJECTION, EMULSION INTRAVENOUS at 08:52

## 2024-08-14 NOTE — ANESTHESIA POSTPROCEDURE EVALUATION
"Patient: Silvio Pepper    Procedure Summary       Date: 08/14/24 Room / Location: [facility] ENDOSCOPY 2 / [facility] ENDOSCOPY    Anesthesia Start: 0848 Anesthesia Stop: 0909    Procedure: COLONOSCOPY WITH ANESTHESIA Diagnosis:       Weight loss      (Weight loss [R63.4])    Surgeons: Jesús Richardson DO Provider: Pepito Díaz CRNA    Anesthesia Type: MAC ASA Status: 3            Anesthesia Type: MAC    Vitals  No vitals data found for the desired time range.          Post Anesthesia Care and Evaluation    Patient location during evaluation: PHASE II  Patient participation: complete - patient participated  Level of consciousness: awake  Pain score: 0  Pain management: adequate    Airway patency: patent  Anesthetic complications: No anesthetic complications  PONV Status: none  Cardiovascular status: acceptable  Respiratory status: acceptable  Hydration status: acceptable    Comments: Blood pressure 103/70, pulse 98, temperature 97.1 °F (36.2 °C), temperature source Temporal, resp. rate 18, height 172.7 cm (68\"), weight 54.4 kg (120 lb), SpO2 97%.      "

## 2024-08-14 NOTE — H&P
[facility]  Pre Procedure History & Physical    Chief Complaint:   Weight Loss    Subjective     HPI:   Weight Loss    Past Medical History:   Past Medical History:   Diagnosis Date    Erectile dysfunction     HIV (human immunodeficiency virus infection)        Past Surgical History:  Past Surgical History:   Procedure Laterality Date    COLONOSCOPY N/A 3/21/2023    Procedure: COLONOSCOPY WITH ANESTHESIA;  Surgeon: Jesús Richardson DO;  Location: [facility] ENDOSCOPY;  Service: Gastroenterology;  Laterality: N/A;  preop; screening   postop; diverticulosis   PCP none     ENDOSCOPY N/A 6/24/2024    Procedure: ESOPHAGOGASTRODUODENOSCOPY WITH ANESTHESIA;  Surgeon: Jesús Richardson DO;  Location: [facility] ENDOSCOPY;  Service: Gastroenterology;  Laterality: N/A;  pre weight loss  post normal  solomon elise DIANA       Family History:  Family History   Problem Relation Age of Onset    No Known Problems Mother     No Known Problems Father     Cancer Sister     Colon cancer Neg Hx     Colon polyps Neg Hx     Esophageal cancer Neg Hx        Social History:   reports that he has been smoking cigarettes. He has a 6.3 pack-year smoking history. He has never used smokeless tobacco. He reports that he does not currently use alcohol. He reports current drug use. Drug: Marijuana.    Medications:   Prior to Admission medications    Medication Sig Start Date End Date Taking? Authorizing Provider   busPIRone (BUSPAR) 10 MG tablet Take 1 tablet by mouth Every 12 (Twelve) Hours. 6/3/24  Yes Frederick Byrne MD   Cholecalciferol (VITAMIN D3) 50 MCG (2000 UT) capsule Take 1 capsule by mouth.   Yes ProviderFrederick MD   Jhesleq-Pljvp-Leefbdzw-TenofAF (GENVOYA) 691-125-887-10 MG per tablet Take  by mouth.   Yes ProviderFrederick MD   Prenatal 28-0.8 MG tablet Take 1 tablet by mouth Daily. 4/17/20  Yes Frederick Byrne MD   venlafaxine XR (EFFEXOR-XR) 75 MG 24 hr capsule Take 1 capsule by mouth Daily. 6/3/24   "Yes ProviderFrederick MD   alendronate (FOSAMAX) 70 MG tablet Take 1 tablet by mouth Every 7 (Seven) Days.    ProviderFrederick MD   mirtazapine (REMERON) 15 MG tablet Take 1 tablet by mouth every night at bedtime. 6/17/24   Frederick Byrne MD   sildenafil (VIAGRA) 25 MG tablet TAKE ONE TABLET BY MOUTH AS DIRECTED 30 MINUTES BEFORE DESIRED EFFECT. MAX DOSE OF 25 MG IN 48 HOURS. 6/30/24   Frederick Byrne MD       Allergies:  Patient has no known allergies.    ROS:    General: Weight stable  Resp: No SOA  Cardiovascular: No CP    Objective     Blood pressure 103/70, pulse 98, temperature 97.1 °F (36.2 °C), temperature source Temporal, resp. rate 18, height 172.7 cm (68\"), weight 54.4 kg (120 lb), SpO2 97%.    Physical Exam   Constitutional: Pt is oriented to person, place, and in no distress.   HENT: Mouth/Throat: Oropharynx is clear.   Cardiovascular: Normal rate, regular rhythm.    Pulmonary/Chest: Effort normal. No respiratory distress. No  wheezes.   Abdominal: Soft. Non-distended.  Skin: Skin is warm and dry.   Psychiatric: Mood, memory, affect and judgment appear normal.     Assessment & Plan     Diagnosis:  Weight Loss    Anticipated Surgical Procedure:  C-scope    The risks, benefits, and alternatives of this procedure have been discussed with the patient or the responsible party- the patient understands and agrees to proceed.        "

## 2024-08-14 NOTE — ANESTHESIA PREPROCEDURE EVALUATION
Anesthesia Evaluation     Patient summary reviewed   no history of anesthetic complications:   NPO Solid Status: > 8 hours  NPO Liquid Status: > 8 hours           Airway   Mallampati: I  TM distance: >3 FB  Neck ROM: full  No difficulty expected  Dental    (+) lower dentures and upper dentures    Pulmonary - negative pulmonary ROS and normal exam   Cardiovascular - negative cardio ROS and normal exam  Exercise tolerance: good (4-7 METS)        Neuro/Psych- negative ROS  GI/Hepatic/Renal/Endo    (+) liver disease    Musculoskeletal (-) negative ROS    Abdominal  - normal exam   Substance History   (+) alcohol use, drug use     OB/GYN          Other - negative ROS                   Anesthesia Plan    ASA 3     MAC     intravenous induction     Anesthetic plan, risks, benefits, and alternatives have been provided, discussed and informed consent has been obtained with: patient.    CODE STATUS:

## 2024-09-09 ENCOUNTER — HOSPITAL ENCOUNTER (OUTPATIENT)
Dept: MRI IMAGING | Facility: HOSPITAL | Age: 54
Discharge: HOME | End: 2024-09-09
Admitting: UROLOGY
Payer: MEDICAID

## 2024-09-09 DIAGNOSIS — R97.20 ELEVATED PROSTATE SPECIFIC ANTIGEN (PSA): ICD-10-CM

## 2024-09-09 PROCEDURE — A9577 INJ MULTIHANCE: HCPCS | Performed by: UROLOGY

## 2024-09-09 PROCEDURE — 0 GADOBENATE DIMEGLUMINE 529 MG/ML SOLUTION: Performed by: UROLOGY

## 2024-09-09 PROCEDURE — 72197 MRI PELVIS W/O & W/DYE: CPT

## 2024-09-09 RX ADMIN — GADOBENATE DIMEGLUMINE 9 ML: 529 INJECTION, SOLUTION INTRAVENOUS at 12:49

## 2024-09-16 ENCOUNTER — OFFICE VISIT (OUTPATIENT)
Dept: UROLOGY | Facility: CLINIC | Age: 54
End: 2024-09-16
Payer: MEDICAID

## 2024-09-16 VITALS — WEIGHT: 115 LBS | TEMPERATURE: 98 F | BODY MASS INDEX: 17.43 KG/M2 | HEIGHT: 68 IN

## 2024-09-16 DIAGNOSIS — N52.01 ERECTILE DYSFUNCTION DUE TO ARTERIAL INSUFFICIENCY: ICD-10-CM

## 2024-09-16 DIAGNOSIS — R97.20 ELEVATED PROSTATE SPECIFIC ANTIGEN (PSA): Primary | ICD-10-CM

## 2024-09-16 PROCEDURE — 1159F MED LIST DOCD IN RCRD: CPT | Performed by: UROLOGY

## 2024-09-16 PROCEDURE — 99214 OFFICE O/P EST MOD 30 MIN: CPT | Performed by: UROLOGY

## 2024-09-16 PROCEDURE — 1160F RVW MEDS BY RX/DR IN RCRD: CPT | Performed by: UROLOGY

## 2024-09-16 RX ORDER — LEVOFLOXACIN 500 MG/1
TABLET, FILM COATED ORAL
Qty: 1 TABLET | Refills: 0 | Status: SHIPPED | OUTPATIENT
Start: 2024-09-16

## 2024-09-16 RX ORDER — TADALAFIL 20 MG/1
TABLET ORAL
Qty: 30 TABLET | Refills: 11 | Status: SHIPPED | OUTPATIENT
Start: 2024-09-16

## 2024-09-16 RX ORDER — CLONIDINE HYDROCHLORIDE 0.1 MG/1
0.1 TABLET ORAL
COMMUNITY

## 2024-09-16 RX ORDER — MAGNESIUM HYDROXIDE 1200 MG/15ML
LIQUID ORAL
Qty: 1 ENEMA | Refills: 0 | Status: SHIPPED | OUTPATIENT
Start: 2024-09-16

## 2024-09-16 RX ORDER — METOPROLOL SUCCINATE 25 MG/1
1 TABLET, EXTENDED RELEASE ORAL DAILY
COMMUNITY
Start: 2024-08-29

## 2024-09-19 ENCOUNTER — PROCEDURE VISIT (OUTPATIENT)
Dept: UROLOGY | Facility: CLINIC | Age: 54
End: 2024-09-19
Payer: MEDICAID

## 2024-09-19 DIAGNOSIS — R97.20 ELEVATED PROSTATE SPECIFIC ANTIGEN (PSA): Primary | ICD-10-CM

## 2024-09-19 PROCEDURE — G0416 PROSTATE BIOPSY, ANY MTHD: HCPCS | Performed by: UROLOGY

## 2024-09-19 NOTE — PROGRESS NOTES
Subjective    Mr. Pepper is 54 y.o. male    Chief Complaint: Elevated PSA    History of Present Illness    54-year-old male established patient follow-up for elevated PSA and ED.  Here to discuss results of prostate biopsy done for elevated PSA up to 8.0 from previous level of 6.0.  Prostate MRI done on 9/9/2024 revealed 24 cc prostate without suspicious lesions.  Pathology results reveal inflammation without evidence for malignancy.  He has tried tadalafil for his ED but would like to go back to sildenafil.         The following portions of the patient's history were reviewed and updated as appropriate: allergies, current medications, past family history, past medical history, past social history, past surgical history and problem list.    Review of Systems      Current Outpatient Medications:     alendronate (FOSAMAX) 70 MG tablet, Take 1 tablet by mouth Every 7 (Seven) Days., Disp: , Rfl:     busPIRone (BUSPAR) 10 MG tablet, Take 1 tablet by mouth Every 12 (Twelve) Hours., Disp: , Rfl:     Cholecalciferol (VITAMIN D3) 50 MCG (2000 UT) capsule, Take 1 capsule by mouth., Disp: , Rfl:     cloNIDine (CATAPRES) 0.1 MG tablet, Take 1 tablet by mouth., Disp: , Rfl:     Ievbwaq-Ubonp-Wvfgvxeq-TenofAF (GENVOYA) [id number] MG per tablet, Take  by mouth., Disp: , Rfl:     levoFLOXacin (LEVAQUIN) 500 MG tablet, 1 tab by mouth the evening prior to prostate biopsy, Disp: 1 tablet, Rfl: 0    metoprolol succinate XL (TOPROL-XL) 25 MG 24 hr tablet, Take 1 tablet by mouth Daily., Disp: , Rfl:     mirtazapine (REMERON) 15 MG tablet, Take 1 tablet by mouth every night at bedtime., Disp: , Rfl:     Prenatal 28-0.8 MG tablet, Take 1 tablet by mouth Daily., Disp: , Rfl:     sodium phosphate (FLEET) 7-19 GM/118ML enema, Use rectally the morning of prostate biopsy, Disp: 1 enema, Rfl: 0    tadalafil (Cialis) 20 MG tablet, 1/2 to 1 tab by mouth 1 hour prior to intercourse, Disp: 30 tablet, Rfl: 11    venlafaxine XR (EFFEXOR-XR) 75 MG 24  hr capsule, Take 1 capsule by mouth Daily., Disp: , Rfl:     Past Medical History:   Diagnosis Date    Erectile dysfunction     HIV (human immunodeficiency virus infection)        Past Surgical History:   Procedure Laterality Date    COLONOSCOPY N/A 3/21/2023    Procedure: COLONOSCOPY WITH ANESTHESIA;  Surgeon: Jesús Richardson DO;  Location: [facility] ENDOSCOPY;  Service: Gastroenterology;  Laterality: N/A;  preop; screening   postop; diverticulosis   PCP none     COLONOSCOPY N/A 8/14/2024    Procedure: COLONOSCOPY WITH ANESTHESIA;  Surgeon: Jesús Richardson DO;  Location: [facility] ENDOSCOPY;  Service: Gastroenterology;  Laterality: N/A;  pre: weightloss  post: polyp. diverticulosis.   Apolonia Elise    ENDOSCOPY N/A 6/24/2024    Procedure: ESOPHAGOGASTRODUODENOSCOPY WITH ANESTHESIA;  Surgeon: Jesús Richardson DO;  Location: [facility] ENDOSCOPY;  Service: Gastroenterology;  Laterality: N/A;  pre weight loss  post normal  solomon elise PA       Social History     Socioeconomic History    Marital status: Single   Tobacco Use    Smoking status: Every Day     Current packs/day: 0.25     Average packs/day: 0.3 packs/day for 25.0 years (6.3 ttl pk-yrs)     Types: Cigarettes    Smokeless tobacco: Never   Vaping Use    Vaping status: Every Day    Substances: Nicotine    Devices: Disposable    Passive vaping exposure: Yes   Substance and Sexual Activity    Alcohol use: Not Currently    Drug use: Yes     Types: Marijuana    Sexual activity: Defer       Family History   Problem Relation Age of Onset    No Known Problems Mother     No Known Problems Father     Cancer Sister     Colon cancer Neg Hx     Colon polyps Neg Hx     Esophageal cancer Neg Hx        Objective    There were no vitals taken for this visit.    Physical Exam        Results for orders placed or performed in visit on 07/31/24   PSA DIAGNOSTIC    Specimen: Blood   Result Value Ref Range    PSA 8.020 (H) 0.000 - 4.000 ng/mL     Assessment and Plan    Diagnoses and all  orders for this visit:    1. Elevated prostate specific antigen (PSA) (Primary)  -     PSA DIAGNOSTIC; Future    2. Erectile dysfunction due to arterial insufficiency  -     sildenafil (VIAGRA) 100 MG tablet; 1/2 to 1 tab by mouth 1 hour prior to intercourse  Dispense: 30 tablet; Refill: 11      He was provided a copy of his benign pathology report.  We discussed a benign prostate biopsy cannot absolutely rule out the presence of prostate cancer for which I recommended follow-up with me in 6 months with repeat pre-clinic PSA.  He requested another prescription for sildenafil for his ED, he was counseled to discontinue the tadalafil.      This document has been signed by HAILE Campos MD on September 30, 2024 10:54 CDT

## 2024-09-26 ENCOUNTER — HOSPITAL ENCOUNTER (OUTPATIENT)
Dept: GENERAL RADIOLOGY | Facility: HOSPITAL | Age: 54
Discharge: HOME | End: 2024-09-26
Admitting: PHYSICIAN ASSISTANT
Payer: MEDICAID

## 2024-09-26 ENCOUNTER — TRANSCRIBE ORDERS (OUTPATIENT)
Dept: ADMINISTRATIVE | Facility: HOSPITAL | Age: 54
End: 2024-09-26
Payer: MEDICAID

## 2024-09-26 DIAGNOSIS — M79.642 PAIN IN LEFT HAND: Primary | ICD-10-CM

## 2024-09-26 LAB
CYTO UR: NORMAL
LAB AP CASE REPORT: NORMAL
Lab: NORMAL
PATH REPORT.FINAL DX SPEC: NORMAL
PATH REPORT.GROSS SPEC: NORMAL

## 2024-09-26 PROCEDURE — 73130 X-RAY EXAM OF HAND: CPT

## 2024-09-30 ENCOUNTER — LAB (OUTPATIENT)
Dept: LAB | Facility: HOSPITAL | Age: 54
End: 2024-09-30
Payer: MEDICAID

## 2024-09-30 ENCOUNTER — TRANSCRIBE ORDERS (OUTPATIENT)
Dept: ADMINISTRATIVE | Facility: HOSPITAL | Age: 54
End: 2024-09-30
Payer: MEDICAID

## 2024-09-30 ENCOUNTER — OFFICE VISIT (OUTPATIENT)
Dept: UROLOGY | Facility: CLINIC | Age: 54
End: 2024-09-30
Payer: MEDICAID

## 2024-09-30 VITALS — BODY MASS INDEX: 18.49 KG/M2 | HEIGHT: 68 IN | WEIGHT: 122 LBS | TEMPERATURE: 98.2 F

## 2024-09-30 DIAGNOSIS — E55.9 AVITAMINOSIS D: ICD-10-CM

## 2024-09-30 DIAGNOSIS — R94.6 NONSPECIFIC ABNORMAL RESULTS OF THYROID FUNCTION STUDY: ICD-10-CM

## 2024-09-30 DIAGNOSIS — E61.8 IODINE DEFICIENCY DISEASE, NON GOITER: ICD-10-CM

## 2024-09-30 DIAGNOSIS — E53.8 BIOTIN-(PROPIONYL-COA-CARBOXYLASE) LIGASE DEFICIENCY: ICD-10-CM

## 2024-09-30 DIAGNOSIS — R64 CACHEXIA: ICD-10-CM

## 2024-09-30 DIAGNOSIS — R97.20 ELEVATED PSA: ICD-10-CM

## 2024-09-30 DIAGNOSIS — R97.20 ELEVATED PROSTATE SPECIFIC ANTIGEN (PSA): ICD-10-CM

## 2024-09-30 DIAGNOSIS — E56.9 MULTIPLE VITAMIN DEFICIENCY DISEASE: ICD-10-CM

## 2024-09-30 DIAGNOSIS — R97.20 ELEVATED PROSTATE SPECIFIC ANTIGEN (PSA): Primary | ICD-10-CM

## 2024-09-30 DIAGNOSIS — N52.01 ERECTILE DYSFUNCTION DUE TO ARTERIAL INSUFFICIENCY: ICD-10-CM

## 2024-09-30 LAB
FERRITIN SERPL-MCNC: 237.7 NG/ML (ref 30–400)
IRON 24H UR-MRATE: 67 MCG/DL (ref 59–158)
IRON SATN MFR SERPL: 20 % (ref 20–50)
T4 FREE SERPL-MCNC: 0.9 NG/DL (ref 0.93–1.7)
TIBC SERPL-MCNC: 328 MCG/DL (ref 298–536)
TRANSFERRIN SERPL-MCNC: 220 MG/DL (ref 200–360)
TSH SERPL DL<=0.05 MIU/L-ACNC: 1.88 UIU/ML (ref 0.27–4.2)

## 2024-09-30 PROCEDURE — 84153 ASSAY OF PSA TOTAL: CPT

## 2024-09-30 PROCEDURE — 86376 MICROSOMAL ANTIBODY EACH: CPT

## 2024-09-30 PROCEDURE — 82533 TOTAL CORTISOL: CPT

## 2024-09-30 PROCEDURE — 82306 VITAMIN D 25 HYDROXY: CPT

## 2024-09-30 PROCEDURE — 84443 ASSAY THYROID STIM HORMONE: CPT

## 2024-09-30 PROCEDURE — 99214 OFFICE O/P EST MOD 30 MIN: CPT | Performed by: UROLOGY

## 2024-09-30 PROCEDURE — 83540 ASSAY OF IRON: CPT

## 2024-09-30 PROCEDURE — 84255 ASSAY OF SELENIUM: CPT

## 2024-09-30 PROCEDURE — 84446 ASSAY OF VITAMIN E: CPT

## 2024-09-30 PROCEDURE — 84630 ASSAY OF ZINC: CPT

## 2024-09-30 PROCEDURE — 84439 ASSAY OF FREE THYROXINE: CPT

## 2024-09-30 PROCEDURE — 1159F MED LIST DOCD IN RCRD: CPT | Performed by: UROLOGY

## 2024-09-30 PROCEDURE — 82747 ASSAY OF FOLIC ACID RBC: CPT

## 2024-09-30 PROCEDURE — 82607 VITAMIN B-12: CPT

## 2024-09-30 PROCEDURE — 84207 ASSAY OF VITAMIN B-6: CPT

## 2024-09-30 PROCEDURE — 84590 ASSAY OF VITAMIN A: CPT

## 2024-09-30 PROCEDURE — 82728 ASSAY OF FERRITIN: CPT

## 2024-09-30 PROCEDURE — 85014 HEMATOCRIT: CPT

## 2024-09-30 PROCEDURE — 84481 FREE ASSAY (FT-3): CPT

## 2024-09-30 PROCEDURE — 82525 ASSAY OF COPPER: CPT

## 2024-09-30 PROCEDURE — 36415 COLL VENOUS BLD VENIPUNCTURE: CPT

## 2024-09-30 PROCEDURE — 1160F RVW MEDS BY RX/DR IN RCRD: CPT | Performed by: UROLOGY

## 2024-09-30 PROCEDURE — 84466 ASSAY OF TRANSFERRIN: CPT

## 2024-09-30 PROCEDURE — 84425 ASSAY OF VITAMIN B-1: CPT

## 2024-09-30 RX ORDER — SILDENAFIL 100 MG/1
TABLET, FILM COATED ORAL
Qty: 30 TABLET | Refills: 11 | Status: SHIPPED | OUTPATIENT
Start: 2024-09-30

## 2024-10-01 LAB
25(OH)D3 SERPL-MCNC: 33.7 NG/ML (ref 30–100)
CORTIS SERPL-MCNC: 8.45 MCG/DL
FOLATE BLD-MCNC: 420 NG/ML
FOLATE RBC-MCNC: 1160 NG/ML
HCT VFR BLD AUTO: 36.2 % (ref 37.5–51)
PSA SERPL-MCNC: 2 NG/ML (ref 0–4)
REFLEX: NORMAL
T3 SERPL-MCNC: 142 NG/DL (ref 71–180)
T3FREE SERPL-MCNC: 3.5 PG/ML (ref 2–4.4)
THYROPEROXIDASE AB SERPL-ACNC: 15 IU/ML (ref 0–34)
VIT B12 BLD-MCNC: 491 PG/ML (ref 211–946)

## 2024-10-02 LAB
COPPER SERPL-MCNC: 102 UG/DL (ref 69–132)
SELENIUM SERPL-MCNC: 111 UG/L (ref 93–198)
ZINC SERPL-MCNC: 62 UG/DL (ref 44–115)

## 2024-10-03 LAB — VIT B1 BLD-SCNC: 125.2 NMOL/L (ref 66.5–200)

## 2024-10-05 LAB
A-TOCOPHEROL VIT E SERPL-MCNC: 12.3 MG/L (ref 7–25.1)
GAMMA TOCOPHEROL SERPL-MCNC: 1 MG/L (ref 0.5–5.5)
VIT A SERPL-MCNC: 31.6 UG/DL (ref 20.1–62)

## 2024-10-09 LAB — PYRIDOXAL PHOS SERPL-MCNC: 20.9 UG/L (ref 3.4–65.2)

## 2024-12-02 ENCOUNTER — OFFICE VISIT (OUTPATIENT)
Age: 54
End: 2024-12-02

## 2024-12-02 VITALS — BODY MASS INDEX: 20.16 KG/M2 | WEIGHT: 133 LBS | HEIGHT: 68 IN

## 2024-12-02 DIAGNOSIS — M18.12 LOCALIZED PRIMARY OSTEOARTHRITIS OF CARPOMETACARPAL JOINT OF LEFT THUMB: Primary | ICD-10-CM

## 2024-12-02 RX ORDER — ELVITEGRAVIR, COBICISTAT, EMTRICITABINE, AND TENOFOVIR ALAFENAMIDE 150; 150; 200; 10 MG/1; MG/1; MG/1; MG/1
TABLET ORAL
COMMUNITY
Start: 2024-11-25

## 2024-12-02 RX ORDER — LEVOFLOXACIN 500 MG/1
TABLET, FILM COATED ORAL
COMMUNITY
Start: 2024-09-16

## 2024-12-02 RX ORDER — METOPROLOL SUCCINATE 25 MG/1
25 TABLET, EXTENDED RELEASE ORAL DAILY
COMMUNITY

## 2024-12-02 RX ORDER — MAGNESIUM HYDROXIDE 1200 MG/15ML
LIQUID ORAL
COMMUNITY
Start: 2024-09-16

## 2024-12-02 RX ORDER — VITAMIN A, VITAMIN C, VITAMIN D, VITAMIN E, THIAMINE, RIBOFLAVIN, NIACIN, VITAMIN B6, FOLIC ACID, VITAMIN B12, CALCIUM, IRON, ZINC, COPPER 4000; 120; 400; 22; 1.84; 3; 20; 10; 1; 12; 200; 27; 25; 2 [IU]/1; MG/1; [IU]/1; [IU]/1; MG/1; MG/1; MG/1; MG/1; MG/1; UG/1; MG/1; MG/1; MG/1; MG/1
1 TABLET ORAL DAILY
COMMUNITY

## 2024-12-02 RX ORDER — MELOXICAM 15 MG/1
15 TABLET ORAL DAILY
Qty: 30 TABLET | Refills: 1 | Status: SHIPPED | OUTPATIENT
Start: 2024-12-02 | End: 2025-01-31

## 2024-12-02 RX ORDER — TADALAFIL 20 MG/1
TABLET ORAL
COMMUNITY
Start: 2024-09-16

## 2024-12-02 RX ORDER — LIDOCAINE HYDROCHLORIDE 10 MG/ML
0.5 INJECTION, SOLUTION EPIDURAL; INFILTRATION; INTRACAUDAL; PERINEURAL ONCE
Status: SHIPPED | OUTPATIENT
Start: 2024-12-02

## 2024-12-02 RX ORDER — VENLAFAXINE HYDROCHLORIDE 75 MG/1
75 CAPSULE, EXTENDED RELEASE ORAL DAILY
COMMUNITY

## 2024-12-02 RX ORDER — MIRTAZAPINE 15 MG/1
15 TABLET, FILM COATED ORAL NIGHTLY
COMMUNITY

## 2024-12-02 RX ORDER — ALENDRONATE SODIUM 70 MG/1
TABLET ORAL
COMMUNITY

## 2024-12-02 RX ORDER — BETAMETHASONE SODIUM PHOSPHATE AND BETAMETHASONE ACETATE 3; 3 MG/ML; MG/ML
3 INJECTION, SUSPENSION INTRA-ARTICULAR; INTRALESIONAL; INTRAMUSCULAR; SOFT TISSUE ONCE
Status: SHIPPED | OUTPATIENT
Start: 2024-12-02

## 2024-12-02 RX ORDER — SILDENAFIL 100 MG/1
TABLET, FILM COATED ORAL
COMMUNITY
Start: 2024-09-30

## 2024-12-02 RX ORDER — BUSPIRONE HYDROCHLORIDE 10 MG/1
10 TABLET ORAL 2 TIMES DAILY
COMMUNITY

## 2024-12-02 NOTE — PROGRESS NOTES
[facility]  [facility]  [address]  [facility] [id number] [phone]     Patient: Juaquin Sebastian   YOB: 1970   Date: 12/2/2024     Chief Complaint   Patient presents with    left hand        History of Present Illness  Juaquin is a 54 y.o. male right-hand-dominant who presents today with chronic, atraumatic left basilar thumb pain.  States that he had a fall off of a stepladder at work a few years ago but does not know if this had any impact on current symptoms.  Over the past year, he has noticed progressively worsening left basilar thumb pain which is aggravated with activity.  Denies any catching, locking or numbness/tingling.      Past Medical History:   Diagnosis Date    ETOH abuse     HIV (human immunodeficiency virus infection) (HCC)     Smoker       No past surgical history on file.   Social History     Socioeconomic History    Marital status: Single   Tobacco Use    Smoking status: Every Day     Current packs/day: 2.00     Types: Cigarettes    Smokeless tobacco: Never   Vaping Use    Vaping status: Some Days   Substance and Sexual Activity    Alcohol use: Yes     Comment: Everyday approximately 1/5th of whiskey    Drug use: Yes     Types: Other-see comments     Comment: Jungle Juice/ Poppers he inhales    Sexual activity: Not Currently     Social Determinants of Health      Received from [facility]    Family and Community Support   Intimate Partner Violence: Not At Risk (8/14/2024)    Received from [facility]    Abuse Screen     Feels Unsafe at Home or Work/School: no     Feels Threatened by Someone: no     Does Anyone Try to Keep You From Having Contact with Others or Doing Things Outside Your Home?: no     Physical Signs of Abuse Present: no    Received from [facility]    Housing Stability      Social History     Occupational History    Not on file   Tobacco Use

## 2025-03-24 NOTE — PROGRESS NOTES
[facility]  [facility]  [address]  [facility] [id number] [phone]     Patient: Juaquin Sebastian   YOB: 1970   Date: 3/25/2025     Chief Complaint   Patient presents with    Follow-up     Left hand        History of Present Illness  Juaquin is a 54 y.o. male right-hand-dominant who returns today for follow-up of symptoms consistent with left thumb CMC primary osteoarthritis.  He underwent initiation of bracing, oral anti-inflammatories and a CMC injection at his last visit which provided significant but now transient relief.  He returns today and states that his symptoms have started to progress.  He denies any interval injury or trauma.      Past Medical History:   Diagnosis Date    ETOH abuse     HIV (human immunodeficiency virus infection) ([facility])     Smoker       No past surgical history on file.   Social History     Socioeconomic History    Marital status: Single     Spouse name: None    Number of children: None    Years of education: None    Highest education level: None   Tobacco Use    Smoking status: Every Day     Current packs/day: 2.00     Types: Cigarettes    Smokeless tobacco: Never   Vaping Use    Vaping status: Some Days   Substance and Sexual Activity    Alcohol use: Yes     Comment: Everyday approximately 1/5th of whiskey    Drug use: Yes     Types: Other-see comments     Comment: Jungle Juice/ Poppers he inhales    Sexual activity: Not Currently     Social Drivers of Health      Received from [facility]    Family and Community Support   Intimate Partner Violence: Not At Risk (8/14/2024)    Received from [facility]    Abuse Screen     Feels Unsafe at Home or Work/School: no     Feels Threatened by Someone: no     Does Anyone Try to Keep You From Having Contact with Others or Doing Things Outside Your Home?: no     Physical Signs of Abuse Present: no    Received from [facility]

## 2025-03-25 ENCOUNTER — OFFICE VISIT (OUTPATIENT)
Age: 55
End: 2025-03-25

## 2025-03-25 VITALS — BODY MASS INDEX: 20.22 KG/M2 | HEIGHT: 68 IN

## 2025-03-25 DIAGNOSIS — M18.12 LOCALIZED PRIMARY OSTEOARTHRITIS OF CARPOMETACARPAL JOINT OF LEFT THUMB: Primary | ICD-10-CM

## 2025-03-25 RX ORDER — LIDOCAINE HYDROCHLORIDE 10 MG/ML
0.5 INJECTION, SOLUTION EPIDURAL; INFILTRATION; INTRACAUDAL; PERINEURAL ONCE
Status: COMPLETED | OUTPATIENT
Start: 2025-03-25 | End: 2025-03-25

## 2025-03-25 RX ORDER — BETAMETHASONE SODIUM PHOSPHATE AND BETAMETHASONE ACETATE 3; 3 MG/ML; MG/ML
3 INJECTION, SUSPENSION INTRA-ARTICULAR; INTRALESIONAL; INTRAMUSCULAR; SOFT TISSUE ONCE
Status: COMPLETED | OUTPATIENT
Start: 2025-03-25 | End: 2025-03-25

## 2025-03-25 RX ORDER — ACETAMINOPHEN 160 MG
TABLET,DISINTEGRATING ORAL
COMMUNITY
Start: 2025-03-17

## 2025-03-25 RX ADMIN — BETAMETHASONE SODIUM PHOSPHATE AND BETAMETHASONE ACETATE 3 MG: 3; 3 INJECTION, SUSPENSION INTRA-ARTICULAR; INTRALESIONAL; INTRAMUSCULAR; SOFT TISSUE at 15:43

## 2025-03-25 RX ADMIN — LIDOCAINE HYDROCHLORIDE 0.5 ML: 10 INJECTION, SOLUTION EPIDURAL; INFILTRATION; INTRACAUDAL; PERINEURAL at 15:44

## 2025-04-21 ENCOUNTER — TELEPHONE (OUTPATIENT)
Dept: UROLOGY | Facility: CLINIC | Age: 55
End: 2025-04-21
Payer: MEDICAID

## 2025-04-21 NOTE — TELEPHONE ENCOUNTER
Called Patient to remind them to get PSA prior to appointment.  Left voicemail for Patient.  If patient calls back it is ok for the HUB to tell the pt the message.

## 2025-04-23 ENCOUNTER — TELEPHONE (OUTPATIENT)
Dept: UROLOGY | Facility: CLINIC | Age: 55
End: 2025-04-23
Payer: MEDICAID

## 2025-04-23 NOTE — TELEPHONE ENCOUNTER
Called Patient to remind them to get PSA prior to appointment.  Left Message  with Patient.  If patient calls back it is ok for the HUB to tell the pt the message.

## 2025-04-29 ENCOUNTER — TELEPHONE (OUTPATIENT)
Dept: UROLOGY | Facility: CLINIC | Age: 55
End: 2025-04-29
Payer: MEDICAID

## 2025-04-29 NOTE — PROGRESS NOTES
Subjective    Mr. Pepper is 55 y.o. male    Chief Complaint: Elevated PSA    History of Present Illness    Lab Results   Component Value Date    PSA 1.490 05/01/2025    PSA 2.0 09/30/2024    PSA 8.020 (H) 07/31/2024       55-year-old male established patient follow-up for elevated PSA and ED.  His most recent PSA is 1.490.  Prostate MRI done on 9/9/2024 revealed 24 cc prostate without suspicious lesions.  Pathology results reveal inflammation without evidence for malignancy.  He states that the sildenafil is not working for him any more. He denies flank pain, hematuria, or dysuria. His urine is clear today.     The following portions of the patient's history were reviewed and updated as appropriate: allergies, current medications, past family history, past medical history, past social history, past surgical history and problem list.    Review of Systems      Current Outpatient Medications:     Cholecalciferol (VITAMIN D3) 50 MCG (2000 UT) capsule, Take 1 capsule by mouth., Disp: , Rfl:     Ayhfqxy-Okhkj-Uvdmggby-TenofAF (GENVOYA) 975-262-148-10 MG per tablet, Take  by mouth., Disp: , Rfl:     mirtazapine (REMERON) 15 MG tablet, Take 1 tablet by mouth every night at bedtime., Disp: , Rfl:     Prenatal 28-0.8 MG tablet, Take 1 tablet by mouth Daily., Disp: , Rfl:     sildenafil (VIAGRA) 100 MG tablet, 1/2 to 1 tab by mouth 1 hour prior to intercourse, Disp: 30 tablet, Rfl: 11    alendronate (FOSAMAX) 70 MG tablet, Take 1 tablet by mouth Every 7 (Seven) Days. (Patient not taking: Reported on 5/5/2025), Disp: , Rfl:     busPIRone (BUSPAR) 10 MG tablet, Take 1 tablet by mouth Every 12 (Twelve) Hours. (Patient not taking: Reported on 5/5/2025), Disp: , Rfl:     cloNIDine (CATAPRES) 0.1 MG tablet, Take 1 tablet by mouth. (Patient not taking: Reported on 5/5/2025), Disp: , Rfl:     levoFLOXacin (LEVAQUIN) 500 MG tablet, 1 tab by mouth the evening prior to prostate biopsy (Patient not taking: Reported on 5/5/2025), Disp: 1  tablet, Rfl: 0    metoprolol succinate XL (TOPROL-XL) 25 MG 24 hr tablet, Take 1 tablet by mouth Daily. (Patient not taking: Reported on 5/5/2025), Disp: , Rfl:     sodium phosphate (FLEET) 7-19 GM/118ML enema, Use rectally the morning of prostate biopsy (Patient not taking: Reported on 5/5/2025), Disp: 1 enema, Rfl: 0    venlafaxine XR (EFFEXOR-XR) 75 MG 24 hr capsule, Take 1 capsule by mouth Daily. (Patient not taking: Reported on 5/5/2025), Disp: , Rfl:     Past Medical History:   Diagnosis Date    Erectile dysfunction     HIV (human immunodeficiency virus infection)        Past Surgical History:   Procedure Laterality Date    COLONOSCOPY N/A 3/21/2023    Procedure: COLONOSCOPY WITH ANESTHESIA;  Surgeon: Jesús Richardson DO;  Location: [facility] ENDOSCOPY;  Service: Gastroenterology;  Laterality: N/A;  preop; screening   postop; diverticulosis   PCP none     COLONOSCOPY N/A 8/14/2024    Procedure: COLONOSCOPY WITH ANESTHESIA;  Surgeon: Jesús Richardson DO;  Location: [facility] ENDOSCOPY;  Service: Gastroenterology;  Laterality: N/A;  pre: weightloss  post: polyp. diverticulosis.   Apolonia Elise    ENDOSCOPY N/A 6/24/2024    Procedure: ESOPHAGOGASTRODUODENOSCOPY WITH ANESTHESIA;  Surgeon: Jesús Richardson DO;  Location: [facility] ENDOSCOPY;  Service: Gastroenterology;  Laterality: N/A;  pre weight loss  post normal  solomon elise PA       Social History     Socioeconomic History    Marital status: Single   Tobacco Use    Smoking status: Every Day     Current packs/day: 0.50     Types: Cigarettes     Passive exposure: Current    Smokeless tobacco: Never   Vaping Use    Vaping status: Every Day    Substances: Nicotine, Flavoring    Devices: Disposable    Passive vaping exposure: Yes   Substance and Sexual Activity    Alcohol use: Not Currently    Drug use: Yes     Types: Marijuana    Sexual activity: Defer       Family History   Problem Relation Age of Onset    No Known Problems Mother     No Known Problems Father     Cancer  "Sister     Colon cancer Neg Hx     Colon polyps Neg Hx     Esophageal cancer Neg Hx        Objective    Temp 98.4 °F (36.9 °C) (Infrared)   Ht 172.7 cm (68\")   Wt 56.2 kg (124 lb)   BMI 18.85 kg/m²     Physical Exam    Constitutional:No apparent distress; vitals reviewed as above  Psychiatric: Appropriate affect; alert and oriented  Musculoskeletal: Normal gait and station  Respiratory: No distress; unlabored movement; no accessory musculature needed with symmetric movements  Skin: No pallor or diaphoresis      Results for orders placed or performed in visit on 05/05/25   POC Urinalysis Dipstick, Multipro    Collection Time: 05/05/25  3:09 PM    Specimen: Urine   Result Value Ref Range    Color Yellow Yellow, Straw, Dark Yellow, Yari    Clarity, UA Clear Clear    Glucose, UA Negative Negative mg/dL    Bilirubin Negative Negative    Ketones, UA Negative Negative    Specific Gravity  1.020 1.005 - 1.030    Blood, UA Trace (A) Negative    pH, Urine 6.0 5.0 - 8.0    Protein, POC Negative Negative mg/dL    Urobilinogen, UA 0.2 E.U./dL Normal, 0.2 E.U./dL    Nitrite, UA Negative Negative    Leukocytes Negative Negative     Assessment and Plan    Diagnoses and all orders for this visit:    1. Elevated prostate specific antigen (PSA) (Primary)  -     POC Urinalysis Dipstick, Multipro  -     PSA Diagnostic; Future    2. Erectile dysfunction due to arterial insufficiency    PSA normal today. He states that the sildenafil is not working for him currently and is interested in injections. Will fax in rx for Trimix injections. He declines wanting injection teaching at this time. Patient will follow up in 1 year with pre-clinic psa.               "

## 2025-05-01 ENCOUNTER — LAB (OUTPATIENT)
Dept: LAB | Facility: HOSPITAL | Age: 55
End: 2025-05-01
Payer: MEDICAID

## 2025-05-02 LAB — PSA SERPL-MCNC: 1.49 NG/ML (ref 0–4)

## 2025-05-05 ENCOUNTER — OFFICE VISIT (OUTPATIENT)
Dept: UROLOGY | Facility: CLINIC | Age: 55
End: 2025-05-05
Payer: MEDICAID

## 2025-05-05 VITALS — BODY MASS INDEX: 18.79 KG/M2 | TEMPERATURE: 98.4 F | WEIGHT: 124 LBS | HEIGHT: 68 IN

## 2025-05-05 DIAGNOSIS — R97.20 ELEVATED PROSTATE SPECIFIC ANTIGEN (PSA): Primary | ICD-10-CM

## 2025-05-05 DIAGNOSIS — N52.01 ERECTILE DYSFUNCTION DUE TO ARTERIAL INSUFFICIENCY: ICD-10-CM

## 2025-05-05 LAB
BILIRUB BLD-MCNC: NEGATIVE MG/DL
CLARITY, POC: CLEAR
COLOR UR: YELLOW
GLUCOSE UR STRIP-MCNC: NEGATIVE MG/DL
KETONES UR QL: NEGATIVE
LEUKOCYTE EST, POC: NEGATIVE
NITRITE UR-MCNC: NEGATIVE MG/ML
PH UR: 6 [PH] (ref 5–8)
PROT UR STRIP-MCNC: NEGATIVE MG/DL
RBC # UR STRIP: ABNORMAL /UL
SP GR UR: 1.02 (ref 1–1.03)
UROBILINOGEN UR QL: ABNORMAL

## 2025-05-09 ENCOUNTER — TELEPHONE (OUTPATIENT)
Dept: UROLOGY | Facility: CLINIC | Age: 55
End: 2025-05-09
Payer: MEDICAID

## 2025-05-09 NOTE — TELEPHONE ENCOUNTER
Pt called to follow up on injections. Told him the pharmacy would mail them to him and then he needed to schedule an appt with us for injection education. Gave pt the phone number for the pharmacy. Pt had no additional questions at this time.

## (undated) DEVICE — Device: Brand: DEFENDO AIR/WATER/SUCTION AND BIOPSY VALVE

## (undated) DEVICE — SNAR POLYP CAPTIVATOR MICROHEX 13 240CM

## (undated) DEVICE — SENSR O2 OXIMAX FNGR A/ 18IN NONSTR

## (undated) DEVICE — CUFF,BP,DISP,1 TUBE,ADULT,HP: Brand: MEDLINE

## (undated) DEVICE — CONMED SCOPE SAVER BITE BLOCK, 20X27 MM: Brand: SCOPE SAVER

## (undated) DEVICE — YANKAUER,BULB TIP WITH VENT: Brand: ARGYLE

## (undated) DEVICE — THE SINGLE USE ETRAP – POLYP TRAP IS USED FOR SUCTION RETRIEVAL OF ENDOSCOPICALLY REMOVED POLYPS.: Brand: ETRAP

## (undated) DEVICE — THE CHANNEL CLEANING BRUSH IS A NYLON FLEXI BRUSH ATTACHED TO A FLEXIBLE PLASTIC SHEATH DESIGNED TO SAFELY REMOVE DEBRIS FROM FLEXIBLE ENDOSCOPES.

## (undated) DEVICE — MASK,OXYGEN,MED CONC,ADLT,7' TUB, UC: Brand: PENDING